# Patient Record
Sex: FEMALE | Race: WHITE | NOT HISPANIC OR LATINO
[De-identification: names, ages, dates, MRNs, and addresses within clinical notes are randomized per-mention and may not be internally consistent; named-entity substitution may affect disease eponyms.]

---

## 2017-01-17 ENCOUNTER — APPOINTMENT (OUTPATIENT)
Dept: PULMONOLOGY | Facility: CLINIC | Age: 57
End: 2017-01-17

## 2017-01-17 VITALS — HEART RATE: 83 BPM | OXYGEN SATURATION: 98 % | DIASTOLIC BLOOD PRESSURE: 86 MMHG | SYSTOLIC BLOOD PRESSURE: 142 MMHG

## 2017-01-17 VITALS — BODY MASS INDEX: 31.35 KG/M2 | WEIGHT: 150 LBS

## 2017-04-11 ENCOUNTER — APPOINTMENT (OUTPATIENT)
Dept: PULMONOLOGY | Facility: CLINIC | Age: 57
End: 2017-04-11

## 2017-04-11 VITALS
SYSTOLIC BLOOD PRESSURE: 126 MMHG | DIASTOLIC BLOOD PRESSURE: 82 MMHG | OXYGEN SATURATION: 98 % | HEART RATE: 76 BPM | HEIGHT: 58 IN | WEIGHT: 150 LBS | BODY MASS INDEX: 31.49 KG/M2

## 2017-04-11 RX ORDER — CODEINE PHOSPHATE AND GUAIFENESIN 10; 100 MG/5ML; MG/5ML
100-10 LIQUID ORAL
Qty: 150 | Refills: 0 | Status: DISCONTINUED | COMMUNITY
Start: 2016-11-10

## 2017-04-11 RX ORDER — AZITHROMYCIN 500 MG/1
500 TABLET, FILM COATED ORAL
Qty: 5 | Refills: 0 | Status: DISCONTINUED | COMMUNITY
Start: 2016-11-10

## 2017-07-18 ENCOUNTER — APPOINTMENT (OUTPATIENT)
Dept: PULMONOLOGY | Facility: CLINIC | Age: 57
End: 2017-07-18

## 2018-04-02 ENCOUNTER — APPOINTMENT (OUTPATIENT)
Dept: PULMONOLOGY | Facility: CLINIC | Age: 58
End: 2018-04-02
Payer: COMMERCIAL

## 2018-04-02 VITALS
SYSTOLIC BLOOD PRESSURE: 118 MMHG | HEIGHT: 58 IN | DIASTOLIC BLOOD PRESSURE: 78 MMHG | HEART RATE: 88 BPM | WEIGHT: 144 LBS | BODY MASS INDEX: 30.23 KG/M2 | OXYGEN SATURATION: 98 %

## 2018-04-02 PROCEDURE — 99214 OFFICE O/P EST MOD 30 MIN: CPT | Mod: 25

## 2018-04-02 PROCEDURE — 85018 HEMOGLOBIN: CPT | Mod: QW

## 2018-04-02 PROCEDURE — 94729 DIFFUSING CAPACITY: CPT

## 2018-04-02 PROCEDURE — 94060 EVALUATION OF WHEEZING: CPT

## 2018-04-02 PROCEDURE — 94727 GAS DIL/WSHOT DETER LNG VOL: CPT

## 2018-04-02 PROCEDURE — 94664 DEMO&/EVAL PT USE INHALER: CPT | Mod: 59

## 2018-05-08 ENCOUNTER — APPOINTMENT (OUTPATIENT)
Dept: PULMONOLOGY | Facility: CLINIC | Age: 58
End: 2018-05-08

## 2018-07-10 ENCOUNTER — APPOINTMENT (OUTPATIENT)
Dept: PULMONOLOGY | Facility: CLINIC | Age: 58
End: 2018-07-10

## 2019-03-20 ENCOUNTER — APPOINTMENT (OUTPATIENT)
Dept: PULMONOLOGY | Facility: CLINIC | Age: 59
End: 2019-03-20
Payer: COMMERCIAL

## 2019-03-20 VITALS
SYSTOLIC BLOOD PRESSURE: 130 MMHG | HEIGHT: 58 IN | WEIGHT: 147 LBS | DIASTOLIC BLOOD PRESSURE: 76 MMHG | BODY MASS INDEX: 30.86 KG/M2 | HEART RATE: 88 BPM | OXYGEN SATURATION: 96 %

## 2019-03-20 PROCEDURE — 94060 EVALUATION OF WHEEZING: CPT

## 2019-03-20 PROCEDURE — 99214 OFFICE O/P EST MOD 30 MIN: CPT | Mod: 25

## 2019-03-20 PROCEDURE — 94664 DEMO&/EVAL PT USE INHALER: CPT | Mod: 59

## 2019-03-20 RX ORDER — BENZONATATE 100 MG/1
100 CAPSULE ORAL
Qty: 30 | Refills: 0 | Status: DISCONTINUED | COMMUNITY
Start: 2018-10-15

## 2019-03-20 RX ORDER — LEVOFLOXACIN 500 MG/1
500 TABLET, FILM COATED ORAL
Qty: 10 | Refills: 0 | Status: DISCONTINUED | COMMUNITY
Start: 2018-10-15

## 2019-03-20 NOTE — DISCUSSION/SUMMARY
[FreeTextEntry1] : \par #1. Consider ENT evaluation for PNDS and sinus complaints\par #2. Continue cough medicine as needed\par #3. Continue Advair and Montelukast for mild asthma given reduction in spirometry and significant BD response in the past\par #4. ProAir as needed\par #5. Prednisone taper; consider empiric abx if no improvement\par #6. Nasonex or Triamcinolone for PNDS as needed\par #7. Diet and exercise for weight loss\par #8. F/u in 2 months with PFTs

## 2019-03-20 NOTE — PROCEDURE
[FreeTextEntry1] : PFTs performed previously were essentially normal and was at baseline.\par PFTs 4/2/18 - normal FVC and mildly reduced FEV1 with an obstructive pattern but no significant BD response. Lung volumes and diffusion capacity are normal.\par Spirometry 3/20/19 - Minimally reduced FVC and mildly reduced FEV1 with an obstructive pattern but no significant BD response; slightly worse than previous\par

## 2019-03-20 NOTE — PHYSICAL EXAM
[General Appearance - Well Developed] : well developed [Normal Appearance] : normal appearance [Normal Conjunctiva] : the conjunctiva exhibited no abnormalities [General Appearance - In No Acute Distress] : no acute distress [Normal Oropharynx] : normal oropharynx [Neck Appearance] : the appearance of the neck was normal [Heart Rate And Rhythm] : heart rate and rhythm were normal [Heart Sounds] : normal S1 and S2 [Murmurs] : no murmurs present [Edema] : no peripheral edema present [] : no respiratory distress [Respiration, Rhythm And Depth] : normal respiratory rhythm and effort [Exaggerated Use Of Accessory Muscles For Inspiration] : no accessory muscle use [Auscultation Breath Sounds / Voice Sounds] : lungs were clear to auscultation bilaterally [Abnormal Walk] : normal gait [Abdomen Soft] : soft [Cyanosis, Localized] : no localized cyanosis [Oriented To Time, Place, And Person] : oriented to person, place, and time [Nail Clubbing] : no clubbing of the fingernails [No Focal Deficits] : no focal deficits [FreeTextEntry1] : No abnormalities

## 2019-03-20 NOTE — HISTORY OF PRESENT ILLNESS
[Mild Persistent] : mild persistent [Doing Well] : doing well [Follow-Up - Routine Clinic] : a routine clinic follow-up of [Excess Weight] : excess weight [Dyspnea] : dyspnea [Low Calorie Diet] : low calorie diet [Fair Compliance] : fair compliance with treatment [Fair Tolerance] : fair tolerance of treatment [Poor Symptom Control] : poor symptom control [Dyslipidemia] : dyslipidemia [High] : high [Low Calorie] : low calorie [Well Balanced Diet] : well balanced meals [None] : The patient does not exercise [FreeTextEntry1] : The patient has been non-compliant with f/u and has not been seen in nearly one year. She now presents for URI symptoms including increased cough, nasal congestion, and SOB. \par  [URI] : upper respiratory tract infection [Adherent] : the patient is adherent with ~his/her~ medication regimen [Goals--Doing Well] : the patient is doing well with ~his/her~ asthma goals

## 2019-03-20 NOTE — REVIEW OF SYSTEMS
[Cough] : cough [Chest Tightness] : chest tightness [Wheezing] : wheezing [Thyroid Problem] : thyroid problem [Fever] : no fever [Chills] : no chills [Dry Eyes] : no dryness of the eyes [Eye Irritation] : no ~T irritation of the eyes [Nasal Congestion] : no nasal congestion [Epistaxis] : no nosebleeds [Postnasal Drip] : no postnasal drip [Sinus Problems] : no sinus problems [Sputum] : not coughing up ~M sputum [Dyspnea] : no dyspnea [Hemoptysis] : no hemoptysis [Hypertension] : no ~T hypertension [Pleuritic Pain] : no pleuritic pain [Dysrhythmia] : no dysrhythmia [Chest Discomfort] : no chest discomfort [Murmurs] : no murmurs were heard [Palpitations] : no palpitations [Edema] : ~T edema was not present [Hay Fever] : no hay fever [Nausea] : no nausea [Itchy Eyes] : no itching of ~T the eyes [Reflux] : no reflux [Constipation] : no constipation [Vomiting] : no vomiting [Abdominal Pain] : no abdominal pain [Diarrhea] : no diarrhea [Trauma] : no ~T physical trauma [Dysuria] : no dysuria [Fracture] : no fracture [Anemia] : no anemia [Headache] : no headache [Dizziness] : no dizziness [Numbness] : no numbness [Syncope] : no fainting [Paralysis] : no paralysis was seen [Depression] : no depression [Seizures] : no seizures [Diabetes] : no diabetes mellitus [Anxiety] : no anxiety [Witnessed Apneas] : demonstrated no ~M apnea [Snoring] : no snoring [Nonrestorative Sleep] : restorative sleep

## 2019-03-20 NOTE — REASON FOR VISIT
[Follow-Up] : a follow-up visit [Asthma] : asthma [Cough] : cough [Shortness of Breath] : shortness of Breath [FreeTextEntry2] : obesity

## 2019-03-20 NOTE — CONSULT LETTER
[Dear  ___] : Dear  [unfilled], [Consult Letter:] : I had the pleasure of evaluating your patient, [unfilled]. [Please see my note below.] : Please see my note below. [Consult Closing:] : Thank you very much for allowing me to participate in the care of this patient.  If you have any questions, please do not hesitate to contact me. [Sincerely,] : Sincerely, [Missael Matias MD] : Missael Matias MD [FreeTextEntry3] : Missael Matias MD, FCCP, JEAN-CLAUDE. ABSM\par

## 2019-07-25 ENCOUNTER — APPOINTMENT (OUTPATIENT)
Dept: PULMONOLOGY | Facility: CLINIC | Age: 59
End: 2019-07-25
Payer: COMMERCIAL

## 2019-07-25 VITALS
HEART RATE: 89 BPM | RESPIRATION RATE: 16 BRPM | DIASTOLIC BLOOD PRESSURE: 78 MMHG | OXYGEN SATURATION: 97 % | SYSTOLIC BLOOD PRESSURE: 124 MMHG

## 2019-07-25 VITALS — HEIGHT: 58 IN | BODY MASS INDEX: 30.44 KG/M2 | WEIGHT: 145 LBS

## 2019-07-25 DIAGNOSIS — Z87.09 PERSONAL HISTORY OF OTHER DISEASES OF THE RESPIRATORY SYSTEM: ICD-10-CM

## 2019-07-25 PROCEDURE — 99214 OFFICE O/P EST MOD 30 MIN: CPT | Mod: 25

## 2019-07-25 PROCEDURE — 94727 GAS DIL/WSHOT DETER LNG VOL: CPT

## 2019-07-25 PROCEDURE — 94010 BREATHING CAPACITY TEST: CPT

## 2019-07-25 PROCEDURE — 94729 DIFFUSING CAPACITY: CPT

## 2019-07-25 PROCEDURE — 85018 HEMOGLOBIN: CPT | Mod: QW

## 2019-07-25 RX ORDER — PREDNISONE 10 MG/1
10 TABLET ORAL
Qty: 50 | Refills: 0 | Status: DISCONTINUED | COMMUNITY
Start: 2019-03-20 | End: 2019-07-25

## 2019-07-25 RX ORDER — PREDNISONE 10 MG/1
10 TABLET ORAL
Qty: 50 | Refills: 0 | Status: DISCONTINUED | COMMUNITY
Start: 2018-04-02 | End: 2019-07-25

## 2019-07-25 NOTE — REVIEW OF SYSTEMS
[Cough] : cough [Chest Tightness] : chest tightness [Wheezing] : wheezing [Thyroid Problem] : thyroid problem [Fever] : no fever [Chills] : no chills [Dry Eyes] : no dryness of the eyes [Eye Irritation] : no ~T irritation of the eyes [Nasal Congestion] : no nasal congestion [Epistaxis] : no nosebleeds [Postnasal Drip] : no postnasal drip [Sinus Problems] : no sinus problems [Sputum] : not coughing up ~M sputum [Hemoptysis] : no hemoptysis [Dyspnea] : no dyspnea [Pleuritic Pain] : no pleuritic pain [Hypertension] : no ~T hypertension [Chest Discomfort] : no chest discomfort [Dysrhythmia] : no dysrhythmia [Murmurs] : no murmurs were heard [Palpitations] : no palpitations [Edema] : ~T edema was not present [Hay Fever] : no hay fever [Itchy Eyes] : no itching of ~T the eyes [Reflux] : no reflux [Nausea] : no nausea [Vomiting] : no vomiting [Constipation] : no constipation [Diarrhea] : no diarrhea [Abdominal Pain] : no abdominal pain [Dysuria] : no dysuria [Trauma] : no ~T physical trauma [Fracture] : no fracture [Anemia] : no anemia [Headache] : no headache [Dizziness] : no dizziness [Syncope] : no fainting [Numbness] : no numbness [Paralysis] : no paralysis was seen [Seizures] : no seizures [Depression] : no depression [Anxiety] : no anxiety [Diabetes] : no diabetes mellitus [Snoring] : no snoring [Witnessed Apneas] : demonstrated no ~M apnea [Nonrestorative Sleep] : restorative sleep

## 2019-07-25 NOTE — PHYSICAL EXAM
[General Appearance - Well Developed] : well developed [Normal Appearance] : normal appearance [General Appearance - In No Acute Distress] : no acute distress [Normal Conjunctiva] : the conjunctiva exhibited no abnormalities [Normal Oropharynx] : normal oropharynx [Neck Appearance] : the appearance of the neck was normal [Heart Rate And Rhythm] : heart rate and rhythm were normal [Heart Sounds] : normal S1 and S2 [Murmurs] : no murmurs present [Edema] : no peripheral edema present [] : no respiratory distress [Respiration, Rhythm And Depth] : normal respiratory rhythm and effort [Exaggerated Use Of Accessory Muscles For Inspiration] : no accessory muscle use [Auscultation Breath Sounds / Voice Sounds] : lungs were clear to auscultation bilaterally [Abdomen Soft] : soft [Abnormal Walk] : normal gait [Nail Clubbing] : no clubbing of the fingernails [Cyanosis, Localized] : no localized cyanosis [No Focal Deficits] : no focal deficits [Oriented To Time, Place, And Person] : oriented to person, place, and time [FreeTextEntry1] : No abnormalities

## 2019-07-25 NOTE — HISTORY OF PRESENT ILLNESS
[Mild Persistent] : mild persistent [Doing Well] : doing well [URI] : upper respiratory tract infection [Adherent] : the patient is adherent with ~his/her~ medication regimen [Goals--Doing Well] : the patient is doing well with ~his/her~ asthma goals [Follow-Up - Routine Clinic] : a routine clinic follow-up of [Excess Weight] : excess weight [Dyspnea] : dyspnea [Low Calorie Diet] : low calorie diet [Fair Compliance] : fair compliance with treatment [Fair Tolerance] : fair tolerance of treatment [Poor Symptom Control] : poor symptom control [Dyslipidemia] : dyslipidemia [High] : high [Low Calorie] : low calorie [Well Balanced Diet] : well balanced meals [None] : The patient does not exercise [FreeTextEntry1] : The patient has been non-compliant with f/u and has not been seen in nearly one year. She now presents for URI symptoms including increased cough, nasal congestion, and SOB. \par

## 2019-07-25 NOTE — PROCEDURE
[FreeTextEntry1] : PFTs performed previously were essentially normal and was at baseline.\par PFTs 4/2/18 - normal FVC and mildly reduced FEV1 with an obstructive pattern but no significant BD response. Lung volumes and diffusion capacity are normal.\par Spirometry 3/20/19 - Minimally reduced FVC and mildly reduced FEV1 with an obstructive pattern but no significant BD response; slightly worse than previous\par PFTs 7/25/19 - Normal FVC and mildly reduced FEV1 without an obstructive pattern today but has had obstruction in the past; lung volumes and diffusion capacity are normal. Overall, stable lung function\par

## 2019-07-25 NOTE — DISCUSSION/SUMMARY
[FreeTextEntry1] : \par #1. Consider ENT evaluation for PNDS and sinus complaints but will hold off as she is feeling better\par #2. Continue cough medicine as needed\par #3. Continue Advair and Montelukast for mild asthma given reduction in spirometry and significant BD response in the past\par #4. ProAir as needed\par #5. Prednisone as needed\par #6. Nasonex or Triamcinolone for PNDS as needed\par #7. Diet and exercise for weight loss\par #8. F/u in 3-4 months

## 2019-11-18 ENCOUNTER — APPOINTMENT (OUTPATIENT)
Dept: PULMONOLOGY | Facility: CLINIC | Age: 59
End: 2019-11-18
Payer: COMMERCIAL

## 2019-11-18 VITALS
HEIGHT: 57 IN | WEIGHT: 145 LBS | HEART RATE: 80 BPM | BODY MASS INDEX: 31.28 KG/M2 | SYSTOLIC BLOOD PRESSURE: 125 MMHG | DIASTOLIC BLOOD PRESSURE: 80 MMHG | OXYGEN SATURATION: 97 %

## 2019-11-18 PROCEDURE — 99214 OFFICE O/P EST MOD 30 MIN: CPT

## 2019-11-18 NOTE — PROCEDURE
[FreeTextEntry1] : PFTs performed previously were essentially normal and was at baseline.\par PFTs 4/2/18 - normal FVC and mildly reduced FEV1 with an obstructive pattern but no significant BD response. Lung volumes and diffusion capacity are normal.\par Spirometry 3/20/19 - Minimally reduced FVC and mildly reduced FEV1 with an obstructive pattern but no significant BD response; slightly worse than previous\par PFTs 7/25/19 - Normal FVC and mildly reduced FEV1 without an obstructive pattern but has had obstruction in the past; lung volumes and diffusion capacity were normal. Overall, stable lung function\par

## 2019-11-18 NOTE — REVIEW OF SYSTEMS
[Cough] : cough [Chest Tightness] : chest tightness [Wheezing] : wheezing [Thyroid Problem] : thyroid problem [Fever] : no fever [Dry Eyes] : no dryness of the eyes [Chills] : no chills [Eye Irritation] : no ~T irritation of the eyes [Nasal Congestion] : no nasal congestion [Epistaxis] : no nosebleeds [Postnasal Drip] : no postnasal drip [Sinus Problems] : no sinus problems [Sputum] : not coughing up ~M sputum [Hemoptysis] : no hemoptysis [Pleuritic Pain] : no pleuritic pain [Dyspnea] : no dyspnea [Hypertension] : no ~T hypertension [Chest Discomfort] : no chest discomfort [Dysrhythmia] : no dysrhythmia [Murmurs] : no murmurs were heard [Palpitations] : no palpitations [Edema] : ~T edema was not present [Hay Fever] : no hay fever [Itchy Eyes] : no itching of ~T the eyes [Reflux] : no reflux [Nausea] : no nausea [Vomiting] : no vomiting [Constipation] : no constipation [Diarrhea] : no diarrhea [Abdominal Pain] : no abdominal pain [Dysuria] : no dysuria [Trauma] : no ~T physical trauma [Fracture] : no fracture [Anemia] : no anemia [Headache] : no headache [Syncope] : no fainting [Dizziness] : no dizziness [Numbness] : no numbness [Paralysis] : no paralysis was seen [Seizures] : no seizures [Depression] : no depression [Anxiety] : no anxiety [Diabetes] : no diabetes mellitus [Witnessed Apneas] : demonstrated no ~M apnea [Snoring] : no snoring [Nonrestorative Sleep] : restorative sleep

## 2019-11-18 NOTE — DISCUSSION/SUMMARY
[FreeTextEntry1] : \par #1. Consider ENT evaluation for PNDS and sinus complaints but will hold off as she is feeling better\par #2. Continue cough medicine as needed\par #3. Continue Advair and Montelukast for mild asthma given reduction in spirometry and significant BD response in the past\par #4. ProAir as needed\par #5. Prednisone as needed\par #6. Nasonex/Flonase for PNDS as needed\par #7. Diet and exercise for weight loss\par #8. F/u in 3-4 months\par #9. Pt s/p flu vaccine for 2019

## 2019-11-18 NOTE — CONSULT LETTER
[Dear  ___] : Dear  [unfilled], [Consult Letter:] : I had the pleasure of evaluating your patient, [unfilled]. [Please see my note below.] : Please see my note below. [Consult Closing:] : Thank you very much for allowing me to participate in the care of this patient.  If you have any questions, please do not hesitate to contact me. [Sincerely,] : Sincerely, [Missael Matias MD] : Missael Matias MD [FreeTextEntry3] : Missael Matias MD, FCCP, D. ABSM\par Pulmonary and Sleep Medicine\par [facility] [place]

## 2019-11-18 NOTE — PHYSICAL EXAM
[General Appearance - Well Developed] : well developed [Normal Appearance] : normal appearance [General Appearance - In No Acute Distress] : no acute distress [Normal Oropharynx] : normal oropharynx [Normal Conjunctiva] : the conjunctiva exhibited no abnormalities [Neck Appearance] : the appearance of the neck was normal [Heart Sounds] : normal S1 and S2 [Heart Rate And Rhythm] : heart rate and rhythm were normal [Edema] : no peripheral edema present [Murmurs] : no murmurs present [] : no respiratory distress [Exaggerated Use Of Accessory Muscles For Inspiration] : no accessory muscle use [Respiration, Rhythm And Depth] : normal respiratory rhythm and effort [Auscultation Breath Sounds / Voice Sounds] : lungs were clear to auscultation bilaterally [Abdomen Soft] : soft [Abnormal Walk] : normal gait [Nail Clubbing] : no clubbing of the fingernails [Cyanosis, Localized] : no localized cyanosis [No Focal Deficits] : no focal deficits [Oriented To Time, Place, And Person] : oriented to person, place, and time [FreeTextEntry1] : No abnormalities

## 2019-11-18 NOTE — HISTORY OF PRESENT ILLNESS
[Mild Persistent] : mild persistent [Doing Well] : doing well [Adherent] : the patient is adherent with ~his/her~ medication regimen [Goals--Doing Well] : the patient is doing well with ~his/her~ asthma goals [Excess Weight] : excess weight [Follow-Up - Routine Clinic] : a routine clinic follow-up of [Dyspnea] : dyspnea [Low Calorie Diet] : low calorie diet [Fair Compliance] : fair compliance with treatment [Fair Tolerance] : fair tolerance of treatment [Poor Symptom Control] : poor symptom control [Dyslipidemia] : dyslipidemia [High] : high [Low Calorie] : low calorie [Well Balanced Diet] : well balanced meals [FreeTextEntry1] : Patient previously had complaints of a URI which has resolved.\par Patient c/o SOBOE but is otherwise without associated respiratory complaints.\par  [None] : None

## 2020-03-24 ENCOUNTER — APPOINTMENT (OUTPATIENT)
Dept: PULMONOLOGY | Facility: CLINIC | Age: 60
End: 2020-03-24

## 2020-12-02 ENCOUNTER — APPOINTMENT (OUTPATIENT)
Dept: PULMONOLOGY | Facility: CLINIC | Age: 60
End: 2020-12-02
Payer: COMMERCIAL

## 2020-12-02 VITALS — WEIGHT: 147 LBS | OXYGEN SATURATION: 98 % | BODY MASS INDEX: 31.81 KG/M2 | HEART RATE: 87 BPM

## 2020-12-02 PROCEDURE — 99214 OFFICE O/P EST MOD 30 MIN: CPT

## 2020-12-02 PROCEDURE — 99072 ADDL SUPL MATRL&STAF TM PHE: CPT

## 2020-12-02 RX ORDER — ERYTHROMYCIN 5 MG/G
5 OINTMENT OPHTHALMIC
Qty: 4 | Refills: 0 | Status: DISCONTINUED | COMMUNITY
Start: 2020-10-23

## 2020-12-02 RX ORDER — OFLOXACIN 3 MG/ML
0.3 SOLUTION/ DROPS OPHTHALMIC
Qty: 5 | Refills: 0 | Status: DISCONTINUED | COMMUNITY
Start: 2020-10-23

## 2020-12-02 RX ORDER — HYDROCORTISONE VALERATE 2 MG/G
0.2 CREAM TOPICAL
Qty: 45 | Refills: 0 | Status: DISCONTINUED | COMMUNITY
Start: 2016-12-15 | End: 2020-12-02

## 2020-12-02 NOTE — CONSULT LETTER
[Dear  ___] : Dear  [unfilled], [Consult Letter:] : I had the pleasure of evaluating your patient, [unfilled]. [Please see my note below.] : Please see my note below. [Consult Closing:] : Thank you very much for allowing me to participate in the care of this patient.  If you have any questions, please do not hesitate to contact me. [Sincerely,] : Sincerely, [Missael Matias MD] : Missael Matias MD [FreeTextEntry3] : Missael Matias MD, FCCP, D. ABSM\par Pulmonary and Sleep Medicine\par [facility] [place]

## 2020-12-02 NOTE — DISCUSSION/SUMMARY
[FreeTextEntry1] : \par #1. Consider ENT evaluation for PNDS and sinus complaints if symptoms persist\par #2. Continue cough medicine as needed\par #3. Continue Advair and Montelukast for mild asthma given reduction in spirometry and significant BD response in the past\par #4. ProAir as needed\par #5. Prednisone as needed; will Rx taper for mild current asthma symptoms\par #6. Nasonex/Flonase for PNDS as needed\par #7. Diet and exercise for weight loss\par #8. F/u in 4-6 weeks with PFTs\par #9. Pt s/p flu vaccine for 2019\par #10. Reviewed risks of exposure and symptoms of Covid-19 virus, including how the virus is spread and precautions to avoid beltran virus.\par \par Patient's questions were answered and patient appears to understand these recommendations

## 2020-12-02 NOTE — HISTORY OF PRESENT ILLNESS
[Mild Persistent] : mild persistent [Doing Well] : doing well [Adherent] : the patient is adherent with ~his/her~ medication regimen [Goals--Doing Well] : the patient is doing well with ~his/her~ asthma goals [Follow-Up - Routine Clinic] : a routine clinic follow-up of [Excess Weight] : excess weight [Dyspnea] : dyspnea [Low Calorie Diet] : low calorie diet [Fair Compliance] : fair compliance with treatment [Fair Tolerance] : fair tolerance of treatment [Poor Symptom Control] : poor symptom control [Dyslipidemia] : dyslipidemia [High] : high [Low Calorie] : low calorie [Well Balanced Diet] : well balanced meals [None] : The patient does not exercise [FreeTextEntry1] : Patient previously had complaints of a URI which has resolved.\par Patient c/o SOBOE but is otherwise without associated respiratory complaints. She was last seen > 1 year ago. She now p/w increased SOB and mild cough though without fevers or other respiratory complaints. She reports increased wheeze at times which resolves with Albuterol use.\par

## 2020-12-28 ENCOUNTER — RX CHANGE (OUTPATIENT)
Age: 60
End: 2020-12-28

## 2021-01-03 ENCOUNTER — APPOINTMENT (OUTPATIENT)
Dept: DISASTER EMERGENCY | Facility: CLINIC | Age: 61
End: 2021-01-03

## 2021-01-03 DIAGNOSIS — Z01.818 ENCOUNTER FOR OTHER PREPROCEDURAL EXAMINATION: ICD-10-CM

## 2021-01-04 LAB — SARS-COV-2 N GENE NPH QL NAA+PROBE: NOT DETECTED

## 2021-01-06 ENCOUNTER — APPOINTMENT (OUTPATIENT)
Dept: PULMONOLOGY | Facility: CLINIC | Age: 61
End: 2021-01-06
Payer: COMMERCIAL

## 2021-01-06 ENCOUNTER — TRANSCRIPTION ENCOUNTER (OUTPATIENT)
Age: 61
End: 2021-01-06

## 2021-01-06 VITALS — WEIGHT: 149 LBS | HEIGHT: 57 IN | BODY MASS INDEX: 32.15 KG/M2

## 2021-01-06 VITALS — OXYGEN SATURATION: 97 % | DIASTOLIC BLOOD PRESSURE: 76 MMHG | HEART RATE: 74 BPM | SYSTOLIC BLOOD PRESSURE: 130 MMHG

## 2021-01-06 PROCEDURE — 94729 DIFFUSING CAPACITY: CPT

## 2021-01-06 PROCEDURE — 94727 GAS DIL/WSHOT DETER LNG VOL: CPT

## 2021-01-06 PROCEDURE — 99214 OFFICE O/P EST MOD 30 MIN: CPT | Mod: 25

## 2021-01-06 PROCEDURE — 99072 ADDL SUPL MATRL&STAF TM PHE: CPT

## 2021-01-06 PROCEDURE — 85018 HEMOGLOBIN: CPT | Mod: QW

## 2021-01-06 PROCEDURE — 94010 BREATHING CAPACITY TEST: CPT

## 2021-01-06 NOTE — HISTORY OF PRESENT ILLNESS
[Mild Persistent] : mild persistent [Doing Well] : doing well [Adherent] : the patient is adherent with ~his/her~ medication regimen [Goals--Doing Well] : the patient is doing well with ~his/her~ asthma goals [Follow-Up - Routine Clinic] : a routine clinic follow-up of [Excess Weight] : excess weight [Dyspnea] : dyspnea [Low Calorie Diet] : low calorie diet [Fair Compliance] : fair compliance with treatment [Fair Tolerance] : fair tolerance of treatment [Poor Symptom Control] : poor symptom control [Dyslipidemia] : dyslipidemia [High] : high [Low Calorie] : low calorie [Well Balanced Diet] : well balanced meals [None] : The patient does not exercise [FreeTextEntry1] : Patient c/o SOBOE but is otherwise without associated respiratory complaints.She now p/w increased SOB and mild cough though without fevers or other respiratory complaints. She reports increased wheeze at times which resolves with Albuterol use but recently she has been c/o SOBOE with no improvement with Albuterol inhaler/nebulizer or with prednisone. She reports she has been quite sedentary.\par

## 2021-01-06 NOTE — DISCUSSION/SUMMARY
[FreeTextEntry1] : \par #1. Consider ENT evaluation for PNDS and sinus complaints if symptoms persist\par #2. Continue cough medicine as needed\par #3. Continue Advair and Montelukast for mild asthma given reduction in spirometry and significant BD response in the past\par #4. ProAir as needed\par #5. Prednisone as needed though did not have relief with it\par #6. Nasonex/Flonase for PNDS as needed\par #7. Diet and exercise for weight loss\par #8. CXR to further evaluate SOBOE\par #9. Pt s/p flu vaccine for 2020\par #10. Cardiology evaluation for SOBOE though may be related to weight and deconditioning\par #11. F/u in 2-3 weeks with CXR\par #12. Reviewed risks of exposure and symptoms of Covid-19 virus, including how the virus is spread and precautions to avoid beltran virus.\par \par Patient's questions were answered and patient appears to understand these recommendations

## 2021-01-06 NOTE — PROCEDURE
[FreeTextEntry1] : PFTs performed previously were essentially normal and was at baseline.\par PFTs 4/2/18 - normal FVC and mildly reduced FEV1 with an obstructive pattern but no significant BD response. Lung volumes and diffusion capacity are normal.\par Spirometry 3/20/19 - Minimally reduced FVC and mildly reduced FEV1 with an obstructive pattern but no significant BD response; slightly worse than previous\par PFTs 7/25/19 - Normal FVC and mildly reduced FEV1 without an obstructive pattern but has had obstruction in the past; lung volumes and diffusion capacity were normal. Overall, stable lung function\par PFTs 1/6/20 - Normal FVC and mildly reduced FEV1 without an obstructive pattern and with normal lung volumes and diffusion capacity; essentially at baseline\par

## 2021-01-06 NOTE — CONSULT LETTER
[Dear  ___] : Dear  [unfilled], [Consult Letter:] : I had the pleasure of evaluating your patient, [unfilled]. [Please see my note below.] : Please see my note below. [Consult Closing:] : Thank you very much for allowing me to participate in the care of this patient.  If you have any questions, please do not hesitate to contact me. [Sincerely,] : Sincerely, [FreeTextEntry3] : Missael Matias MD, FCCP, D. ABSM\par Pulmonary and Sleep Medicine\par [facility] at [place]

## 2021-01-07 ENCOUNTER — OUTPATIENT (OUTPATIENT)
Dept: OUTPATIENT SERVICES | Facility: HOSPITAL | Age: 61
LOS: 1 days | End: 2021-01-07
Payer: COMMERCIAL

## 2021-01-07 ENCOUNTER — APPOINTMENT (OUTPATIENT)
Dept: RADIOLOGY | Facility: CLINIC | Age: 61
End: 2021-01-07
Payer: COMMERCIAL

## 2021-01-07 ENCOUNTER — NON-APPOINTMENT (OUTPATIENT)
Age: 61
End: 2021-01-07

## 2021-01-07 DIAGNOSIS — R06.02 SHORTNESS OF BREATH: ICD-10-CM

## 2021-01-07 PROCEDURE — 71046 X-RAY EXAM CHEST 2 VIEWS: CPT

## 2021-01-07 PROCEDURE — 71046 X-RAY EXAM CHEST 2 VIEWS: CPT | Mod: 26

## 2021-01-12 ENCOUNTER — NON-APPOINTMENT (OUTPATIENT)
Age: 61
End: 2021-01-12

## 2021-01-12 ENCOUNTER — APPOINTMENT (OUTPATIENT)
Dept: CARDIOLOGY | Facility: CLINIC | Age: 61
End: 2021-01-12
Payer: COMMERCIAL

## 2021-01-12 VITALS
BODY MASS INDEX: 32.79 KG/M2 | DIASTOLIC BLOOD PRESSURE: 80 MMHG | WEIGHT: 152 LBS | TEMPERATURE: 98 F | SYSTOLIC BLOOD PRESSURE: 147 MMHG | HEIGHT: 57 IN | RESPIRATION RATE: 16 BRPM | HEART RATE: 88 BPM

## 2021-01-12 VITALS — HEIGHT: 59 IN | BODY MASS INDEX: 30.7 KG/M2

## 2021-01-12 DIAGNOSIS — R00.2 PALPITATIONS: ICD-10-CM

## 2021-01-12 DIAGNOSIS — R01.1 CARDIAC MURMUR, UNSPECIFIED: ICD-10-CM

## 2021-01-12 PROCEDURE — 99244 OFF/OP CNSLTJ NEW/EST MOD 40: CPT

## 2021-01-12 PROCEDURE — 93000 ELECTROCARDIOGRAM COMPLETE: CPT

## 2021-01-12 PROCEDURE — 99072 ADDL SUPL MATRL&STAF TM PHE: CPT

## 2021-01-12 RX ORDER — PREDNISONE 10 MG/1
10 TABLET ORAL
Qty: 50 | Refills: 0 | Status: DISCONTINUED | COMMUNITY
Start: 2020-12-02 | End: 2021-01-12

## 2021-01-12 RX ORDER — TRIAMCINOLONE ACETONIDE 55 UG/1
55 SOLUTION/ DROPS OPHTHALMIC
Refills: 0 | Status: DISCONTINUED | COMMUNITY
Start: 2017-04-11 | End: 2021-01-12

## 2021-01-12 NOTE — PHYSICAL EXAM
[General Appearance - Well Developed] : well developed [General Appearance - Well Nourished] : well nourished [Normal Conjunctiva] : the conjunctiva exhibited no abnormalities [Normal Oropharynx] : normal oropharynx [Normal Jugular Venous V Waves Present] : normal jugular venous V waves present [Heart Rate And Rhythm] : heart rate and rhythm were normal [Heart Sounds] : normal S1 and S2 [Edema] : no peripheral edema present [Respiration, Rhythm And Depth] : normal respiratory rhythm and effort [Auscultation Breath Sounds / Voice Sounds] : lungs were clear to auscultation bilaterally [Abdomen Soft] : soft [Abdomen Tenderness] : non-tender [Abnormal Walk] : normal gait [Nail Clubbing] : no clubbing of the fingernails [Cyanosis, Localized] : no localized cyanosis [Skin Color & Pigmentation] : normal skin color and pigmentation [FreeTextEntry1] : II/VI early systolic murmur at the base, ? radiation to the neck  [Oriented To Time, Place, And Person] : oriented to person, place, and time

## 2021-01-12 NOTE — HISTORY OF PRESENT ILLNESS
[FreeTextEntry1] : Patient is a 61yo F former smoker, strong family h/o CAD, obesity, asthma here for cardiac evaluation of dyspnea. Having worsening shortness of breath and recent PFTS not changed significantly. CXR done as well. Feels fluttering in chest at times.  had COVID in December, got clearance from [facility] to end quarantine. BReathing got worse past few weeks and using inhaler more. Will feel winded going up stairs or moving quickly. Temperature affects her as well, worse inside if too hot. Notes some chest pressure as well, not necessarily exertional. COmes and goes spontaneously. Will get a bit dizzy if moves too fast. No orthopnea, does wake up at times at night a bit SOB. No edema/syncope/claudication.  Has not been taking statin regularly or thyroid medication. \par \par Lives with  and teenage kids. 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 \par \par ROS: GI negative, all others negative

## 2021-01-12 NOTE — DISCUSSION/SUMMARY
[FreeTextEntry1] : Patient is a 59yo F former smoker, family h/o CAD, obesity, asthma here for cardiac evaluation of dyspnea. Per pulmonary, change in breathing not related to her asthma. Possibly weight and deconditioning, also significant stress. NEeds cardiac eval to ensure not an anginal equivalent. Strong family history of CAD as well. \par \par 1. Echo and exercise nuclear stress testing to evaluate TESFAYE/CP/Palps \par 2. Recommend aggressive diet and lifestyle modifications, counselled on weight loss\par 3. Regular follow up with PMD and pulmonary\par 4. Advised to continue statin\par 5. Follow up after testing

## 2021-01-20 ENCOUNTER — APPOINTMENT (OUTPATIENT)
Dept: CARDIOLOGY | Facility: CLINIC | Age: 61
End: 2021-01-20
Payer: COMMERCIAL

## 2021-01-20 PROCEDURE — 78452 HT MUSCLE IMAGE SPECT MULT: CPT

## 2021-01-20 PROCEDURE — A9500: CPT

## 2021-01-20 PROCEDURE — 93015 CV STRESS TEST SUPVJ I&R: CPT

## 2021-01-20 PROCEDURE — 99072 ADDL SUPL MATRL&STAF TM PHE: CPT

## 2021-01-20 RX ORDER — AMINOPHYLLINE 25 MG/ML
25 INJECTION, SOLUTION INTRAVENOUS
Qty: 0 | Refills: 0 | Status: COMPLETED | OUTPATIENT
Start: 2021-01-20

## 2021-01-20 RX ORDER — REGADENOSON 0.08 MG/ML
0.4 INJECTION, SOLUTION INTRAVENOUS
Qty: 1 | Refills: 0 | Status: COMPLETED | OUTPATIENT
Start: 2021-01-20

## 2021-01-20 RX ADMIN — REGADENOSON 0 MG/5ML: 0.08 INJECTION, SOLUTION INTRAVENOUS at 00:00

## 2021-02-03 ENCOUNTER — APPOINTMENT (OUTPATIENT)
Dept: CARDIOLOGY | Facility: CLINIC | Age: 61
End: 2021-02-03
Payer: COMMERCIAL

## 2021-02-03 PROCEDURE — 93306 TTE W/DOPPLER COMPLETE: CPT

## 2021-02-03 PROCEDURE — 99072 ADDL SUPL MATRL&STAF TM PHE: CPT

## 2021-02-03 PROCEDURE — 93880 EXTRACRANIAL BILAT STUDY: CPT

## 2021-02-11 ENCOUNTER — APPOINTMENT (OUTPATIENT)
Dept: CARDIOLOGY | Facility: CLINIC | Age: 61
End: 2021-02-11
Payer: COMMERCIAL

## 2021-02-11 VITALS
RESPIRATION RATE: 16 BRPM | DIASTOLIC BLOOD PRESSURE: 81 MMHG | WEIGHT: 147 LBS | SYSTOLIC BLOOD PRESSURE: 155 MMHG | TEMPERATURE: 97.7 F | HEIGHT: 59 IN | HEART RATE: 86 BPM | BODY MASS INDEX: 29.64 KG/M2

## 2021-02-11 PROCEDURE — 99214 OFFICE O/P EST MOD 30 MIN: CPT

## 2021-02-11 PROCEDURE — 99072 ADDL SUPL MATRL&STAF TM PHE: CPT

## 2021-02-11 NOTE — PHYSICAL EXAM
[General Appearance - Well Developed] : well developed [General Appearance - Well Nourished] : well nourished [Normal Conjunctiva] : the conjunctiva exhibited no abnormalities [Normal Oropharynx] : normal oropharynx [Normal Jugular Venous V Waves Present] : normal jugular venous V waves present [Respiration, Rhythm And Depth] : normal respiratory rhythm and effort [Auscultation Breath Sounds / Voice Sounds] : lungs were clear to auscultation bilaterally [Heart Rate And Rhythm] : heart rate and rhythm were normal [Heart Sounds] : normal S1 and S2 [Edema] : no peripheral edema present [Abdomen Soft] : soft [Abdomen Tenderness] : non-tender [Abnormal Walk] : normal gait [Nail Clubbing] : no clubbing of the fingernails [Cyanosis, Localized] : no localized cyanosis [Skin Color & Pigmentation] : normal skin color and pigmentation [Oriented To Time, Place, And Person] : oriented to person, place, and time [FreeTextEntry1] : II/VI early systolic murmur at the base, ? radiation to the neck

## 2021-02-11 NOTE — ASSESSMENT
[FreeTextEntry1] : \par \par  HDL 78   (2/2020) \par \par \par CAROTID 2/2021:\par 1. 1-49% ICA stenosis bilaterally\par 2. Tortuous vessel, cannot exclude higher degree stenosis distal LYLA \par 3. Antegrade fllow in the vertebral arteries bilaterally \par \par ECHO 2/2021:\par 1. Normal LV size, systolic and diastolic function. EF 60-65%\par 2. Normal RV/LA/RA \par 3. Trivial AI\par 4. Mild MR\par 5. Mild TR, RVSP 25mmHg\par \par PHARM NUCLEAR STRESS TEST 1/2021:\par 1. Negative for ischemia/infarct\par 2. EF 72%\par 3. BP hypertensive baseline, normal response

## 2021-02-11 NOTE — DISCUSSION/SUMMARY
[FreeTextEntry1] : Patient is a 59yo F former smoker, family h/o CAD, obesity, asthma here for cardiac follow up dyspnea\par -Nuclear stress testing 2/2021 without ischemia\par -Echo 2/2021 with preserved BiV function, mild MR needs surveillance only\par -Mild to moderate carotid stenosis, cannot exclude higher degree on recent duplex. Recommend med management and surveillance. \par -Dyspnea appears non-cardiac, will be seeing pulmonary again. Component deconditioning and weight \par \par 1. Continue medical management of carotid stenosis, repeat study in 6 months. Start ASA 81mg daily, increase Rosuvastatin to 10mg qhs\par 2. Recommend aggressive diet and lifestyle modifications, counselled on weight loss to help symptoms\par 3. Regular follow up with PMD and pulmonary\par 4. Needs hypertension treated. Start Valsartan 80mg daily, check BMP/Mg in a couple weeks.  Will also check CMP, CBC, TSH and BNP given continues dyspnea. Check lipids as well\par 5. Consider CTA chest \par 6. Follow up 3 months

## 2021-02-11 NOTE — HISTORY OF PRESENT ILLNESS
[FreeTextEntry1] : Patient is a 59yo F former smoker, strong family h/o CAD, obesity, asthma here for cardiac follow up of dyspnea. Due to symptoms underwent cardiac testing/work up.\par \par Was having worsening shortness of breath and recent PFTS not changed significantly. CXR done as well. Feels fluttering in chest at times.  had COVID in December, got clearance from [facility] to end quarantine. BReathing got worse past few weeks and using inhaler more. Will feel winded going up stairs or moving quickly. Temperature affects her as well, worse inside if too hot. Notes some chest pressure as well, not necessarily exertional. COmes and goes spontaneously. Will get a bit dizzy if moves too fast. No orthopnea, does wake up at times at night a bit SOB. No edema/syncope/claudication.  Has not been taking statin regularly or thyroid medication. Still getting SOB. \par \par Lives with  and teenage kids. 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 \par \par ROS: GI and  negative

## 2021-02-12 ENCOUNTER — APPOINTMENT (OUTPATIENT)
Dept: PULMONOLOGY | Facility: CLINIC | Age: 61
End: 2021-02-12
Payer: COMMERCIAL

## 2021-02-12 VITALS
SYSTOLIC BLOOD PRESSURE: 130 MMHG | WEIGHT: 147 LBS | BODY MASS INDEX: 29.69 KG/M2 | DIASTOLIC BLOOD PRESSURE: 70 MMHG | OXYGEN SATURATION: 97 % | HEART RATE: 93 BPM

## 2021-02-12 PROCEDURE — 99072 ADDL SUPL MATRL&STAF TM PHE: CPT

## 2021-02-12 PROCEDURE — 99214 OFFICE O/P EST MOD 30 MIN: CPT

## 2021-02-12 NOTE — HISTORY OF PRESENT ILLNESS
[Mild Persistent] : mild persistent [Doing Well] : doing well [Adherent] : the patient is adherent with ~his/her~ medication regimen [Goals--Doing Well] : the patient is doing well with ~his/her~ asthma goals [Follow-Up - Routine Clinic] : a routine clinic follow-up of [Excess Weight] : excess weight [Dyspnea] : dyspnea [Low Calorie Diet] : low calorie diet [Fair Compliance] : fair compliance with treatment [Fair Tolerance] : fair tolerance of treatment [Poor Symptom Control] : poor symptom control [Dyslipidemia] : dyslipidemia [High] : high [Low Calorie] : low calorie [Well Balanced Diet] : well balanced meals [Excessive Daytime Sleepiness] : excessive daytime sleepiness [Snoring] : snoring [Unrefreshing Sleep] : unrefreshing sleep [Sleepy When Sedentary] : sleepy when sedentary [Currently Experiencing] : The patient is currently experiencing symptoms. [None] : The patient is not currently being treated for this problem [FreeTextEntry1] : Patient c/o SOBOE but is otherwise without associated respiratory complaints.She now p/w increased SOB and mild cough though without fevers or other respiratory complaints. She reports increased wheeze at times which resolves with Albuterol use but recently she has been c/o SOBOE with no improvement with Albuterol inhaler/nebulizer or with prednisone. She reports she has been quite sedentary which is likely contributing to her SOBOE.\par She was seen by cardiology with a reported negative w/u.\par

## 2021-02-12 NOTE — REVIEW OF SYSTEMS
[Cough] : cough [Chest Tightness] : chest tightness [Wheezing] : wheezing [Thyroid Problem] : thyroid problem [Fever] : no fever [Chills] : no chills [Dry Eyes] : no dryness of the eyes [Eye Irritation] : no ~T irritation of the eyes [Nasal Congestion] : no nasal congestion [Epistaxis] : no nosebleeds [Postnasal Drip] : no postnasal drip [Sinus Problems] : no sinus problems [Sputum] : not coughing up ~M sputum [Hemoptysis] : no hemoptysis [Dyspnea] : no dyspnea [Hypertension] : no ~T hypertension [Pleuritic Pain] : no pleuritic pain [Chest Discomfort] : no chest discomfort [Dysrhythmia] : no dysrhythmia [Murmurs] : no murmurs were heard [Palpitations] : no palpitations [Edema] : ~T edema was not present [Hay Fever] : no hay fever [Itchy Eyes] : no itching of ~T the eyes [Reflux] : no reflux [Nausea] : no nausea [Vomiting] : no vomiting [Constipation] : no constipation [Diarrhea] : no diarrhea [Abdominal Pain] : no abdominal pain [Dysuria] : no dysuria [Trauma] : no ~T physical trauma [Fracture] : no fracture [Anemia] : no anemia [Headache] : no headache [Dizziness] : no dizziness [Syncope] : no fainting [Numbness] : no numbness [Paralysis] : no paralysis was seen [Seizures] : no seizures [Depression] : no depression [Anxiety] : no anxiety [Diabetes] : no diabetes mellitus [Snoring] : no snoring [Witnessed Apneas] : demonstrated no ~M apnea [Nonrestorative Sleep] : restorative sleep

## 2021-02-12 NOTE — CONSULT LETTER
[Dear  ___] : Dear  [unfilled], [Consult Letter:] : I had the pleasure of evaluating your patient, [unfilled]. [Please see my note below.] : Please see my note below. [Consult Closing:] : Thank you very much for allowing me to participate in the care of this patient.  If you have any questions, please do not hesitate to contact me. [Sincerely,] : Sincerely, [FreeTextEntry3] : Missael Matias MD, FCCP, D. ABSM\par Pulmonary and Sleep Medicine\par [facility] at [place]

## 2021-02-12 NOTE — PROCEDURE
[FreeTextEntry1] : PFTs performed previously were essentially normal and was at baseline.\par PFTs 4/2/18 - normal FVC and mildly reduced FEV1 with an obstructive pattern but no significant BD response. Lung volumes and diffusion capacity are normal.\par Spirometry 3/20/19 - Minimally reduced FVC and mildly reduced FEV1 with an obstructive pattern but no significant BD response; slightly worse than previous\par PFTs 7/25/19 - Normal FVC and mildly reduced FEV1 without an obstructive pattern but has had obstruction in the past; lung volumes and diffusion capacity were normal. Overall, stable lung function\par PFTs 1/6/21 - Normal FVC and mildly reduced FEV1 without an obstructive pattern and with normal lung volumes and diffusion capacity; essentially at baseline\par

## 2021-02-12 NOTE — DISCUSSION/SUMMARY
[FreeTextEntry1] : \par #1. Consider ENT evaluation for PNDS and sinus complaints if symptoms persist\par #2. Continue cough medicine as needed\par #3. Continue Advair and Montelukast for mild asthma given reduction in spirometry and significant BD response in the past\par #4. ProAir as needed\par #5. Prednisone as needed though did not have relief with it\par #6. Nasonex/Flonase for PNDS as needed\par #7. Diet and exercise for weight loss\par #8. CXR to further evaluate SOBOE was clear\par #9. Pt s/p flu vaccine for 2020\par #10. Cardiology f/u for SOBOE though may be related to weight and deconditioning given reported negative w/u\par #11. Will obtain CTA to r/o PE as another cause of SOB given no other cardiac or pulmonary cause; will check D-dimer and BMP as well\par #12. Home PSG to evaluate for possible ANGEL\par #13. Reviewed risks of exposure and symptoms of Covid-19 virus, including how the virus is spread and precautions to avoid beltran virus.\par \par Patient's questions were answered and patient appears to understand these recommendations

## 2021-02-12 NOTE — END OF VISIT
[Time Spent: ___ minutes] : I have spent [unfilled] minutes of time on the encounter.
Patient states no history

## 2021-02-16 LAB
ANION GAP SERPL CALC-SCNC: 10 MMOL/L
BUN SERPL-MCNC: 18 MG/DL
CALCIUM SERPL-MCNC: 9.1 MG/DL
CHLORIDE SERPL-SCNC: 103 MMOL/L
CO2 SERPL-SCNC: 27 MMOL/L
CREAT SERPL-MCNC: 0.83 MG/DL
DEPRECATED D DIMER PPP IA-ACNC: 165 NG/ML DDU
GLUCOSE SERPL-MCNC: 119 MG/DL
POTASSIUM SERPL-SCNC: 4.2 MMOL/L
SODIUM SERPL-SCNC: 140 MMOL/L

## 2021-02-17 ENCOUNTER — OUTPATIENT (OUTPATIENT)
Dept: OUTPATIENT SERVICES | Facility: HOSPITAL | Age: 61
LOS: 1 days | End: 2021-02-17
Payer: COMMERCIAL

## 2021-02-17 ENCOUNTER — APPOINTMENT (OUTPATIENT)
Dept: CT IMAGING | Facility: CLINIC | Age: 61
End: 2021-02-17
Payer: COMMERCIAL

## 2021-02-17 DIAGNOSIS — Z00.8 ENCOUNTER FOR OTHER GENERAL EXAMINATION: ICD-10-CM

## 2021-02-17 DIAGNOSIS — R06.02 SHORTNESS OF BREATH: ICD-10-CM

## 2021-02-17 PROCEDURE — 71275 CT ANGIOGRAPHY CHEST: CPT

## 2021-02-17 PROCEDURE — 71275 CT ANGIOGRAPHY CHEST: CPT | Mod: 26

## 2021-02-19 ENCOUNTER — APPOINTMENT (OUTPATIENT)
Dept: PULMONOLOGY | Facility: CLINIC | Age: 61
End: 2021-02-19
Payer: COMMERCIAL

## 2021-02-19 VITALS
WEIGHT: 147 LBS | HEIGHT: 59 IN | SYSTOLIC BLOOD PRESSURE: 105 MMHG | OXYGEN SATURATION: 95 % | HEART RATE: 87 BPM | RESPIRATION RATE: 16 BRPM | DIASTOLIC BLOOD PRESSURE: 70 MMHG | BODY MASS INDEX: 29.64 KG/M2

## 2021-02-19 PROCEDURE — 99214 OFFICE O/P EST MOD 30 MIN: CPT

## 2021-02-19 PROCEDURE — 99072 ADDL SUPL MATRL&STAF TM PHE: CPT

## 2021-02-19 NOTE — DISCUSSION/SUMMARY
[FreeTextEntry1] : \par #1. Consider ENT evaluation for PNDS and sinus complaints if symptoms persist\par #2. Continue cough medicine as needed\par #3. Continue Advair and Montelukast for mild asthma given reduction in spirometry and significant BD response in the past\par #4. ProAir as needed\par #5. Prednisone as needed though did not have relief with it\par #6. Nasonex/Flonase for PNDS as needed\par #7. Diet and exercise for weight loss\par #8. CXR to further evaluate SOBOE was clear\par #9. Pt s/p flu vaccine for 2020\par #10. Cardiology f/u for SOBOE though may be related to weight and deconditioning given reported negative w/u\par #11. CTA and D-dimer were normal\par #12. Home PSG to evaluate for possible ANGEL\par #13. F/u after HST\par #14. PCP f/u for hyperglycemia noted on BMP\par #15. Reviewed risks of exposure and symptoms of Covid-19 virus, including how the virus is spread and precautions to avoid beltran virus.\par \par Patient's questions were answered and patient appears to understand these recommendations

## 2021-02-19 NOTE — CONSULT LETTER
[Dear  ___] : Dear  [unfilled], [Consult Letter:] : I had the pleasure of evaluating your patient, [unfilled]. [Please see my note below.] : Please see my note below. [Consult Closing:] : Thank you very much for allowing me to participate in the care of this patient.  If you have any questions, please do not hesitate to contact me. [Sincerely,] : Sincerely, [FreeTextEntry3] : Missael Matias MD, FCCP, D. ABSM\par Pulmonary and Sleep Medicine\par [facility] at [place]

## 2021-03-02 RX ORDER — KIT FOR THE PREPARATION OF TECHNETIUM TC99M SESTAMIBI 1 MG/5ML
INJECTION, POWDER, LYOPHILIZED, FOR SOLUTION PARENTERAL
Refills: 0 | Status: COMPLETED | OUTPATIENT
Start: 2021-03-02

## 2021-03-02 RX ADMIN — KIT FOR THE PREPARATION OF TECHNETIUM TC99M SESTAMIBI 0: 1 INJECTION, POWDER, LYOPHILIZED, FOR SOLUTION PARENTERAL at 00:00

## 2021-04-02 LAB
ALBUMIN SERPL ELPH-MCNC: 4.2 G/DL
ALP BLD-CCNC: 71 U/L
ALT SERPL-CCNC: 21 U/L
ANION GAP SERPL CALC-SCNC: 10 MMOL/L
AST SERPL-CCNC: 15 U/L
BASOPHILS # BLD AUTO: 0.06 K/UL
BASOPHILS NFR BLD AUTO: 0.9 %
BILIRUB SERPL-MCNC: 0.3 MG/DL
BUN SERPL-MCNC: 18 MG/DL
CALCIUM SERPL-MCNC: 9.1 MG/DL
CHLORIDE SERPL-SCNC: 108 MMOL/L
CHOLEST SERPL-MCNC: 167 MG/DL
CO2 SERPL-SCNC: 26 MMOL/L
CREAT SERPL-MCNC: 0.71 MG/DL
EOSINOPHIL # BLD AUTO: 0.37 K/UL
EOSINOPHIL NFR BLD AUTO: 5.6 %
GLUCOSE SERPL-MCNC: 105 MG/DL
HCT VFR BLD CALC: 38.7 %
HDLC SERPL-MCNC: 60 MG/DL
HGB BLD-MCNC: 12.1 G/DL
IMM GRANULOCYTES NFR BLD AUTO: 0.2 %
LDLC SERPL CALC-MCNC: 81 MG/DL
LYMPHOCYTES # BLD AUTO: 2.76 K/UL
LYMPHOCYTES NFR BLD AUTO: 41.6 %
MAGNESIUM SERPL-MCNC: 2.3 MG/DL
MAN DIFF?: NORMAL
MCHC RBC-ENTMCNC: 27 PG
MCHC RBC-ENTMCNC: 31.3 GM/DL
MCV RBC AUTO: 86.4 FL
MONOCYTES # BLD AUTO: 0.5 K/UL
MONOCYTES NFR BLD AUTO: 7.5 %
NEUTROPHILS # BLD AUTO: 2.94 K/UL
NEUTROPHILS NFR BLD AUTO: 44.2 %
NONHDLC SERPL-MCNC: 107 MG/DL
NT-PROBNP SERPL-MCNC: 10 PG/ML
PLATELET # BLD AUTO: 215 K/UL
POTASSIUM SERPL-SCNC: 4.4 MMOL/L
PROT SERPL-MCNC: 6.7 G/DL
RBC # BLD: 4.48 M/UL
RBC # FLD: 13.1 %
SODIUM SERPL-SCNC: 144 MMOL/L
TRIGL SERPL-MCNC: 129 MG/DL
TSH SERPL-ACNC: 4.55 UIU/ML
WBC # FLD AUTO: 6.64 K/UL

## 2021-04-13 ENCOUNTER — RX CHANGE (OUTPATIENT)
Age: 61
End: 2021-04-13

## 2021-05-12 ENCOUNTER — NON-APPOINTMENT (OUTPATIENT)
Age: 61
End: 2021-05-12

## 2021-05-12 ENCOUNTER — APPOINTMENT (OUTPATIENT)
Dept: CARDIOLOGY | Facility: CLINIC | Age: 61
End: 2021-05-12
Payer: COMMERCIAL

## 2021-05-12 ENCOUNTER — RX CHANGE (OUTPATIENT)
Age: 61
End: 2021-05-12

## 2021-05-12 VITALS
WEIGHT: 143 LBS | RESPIRATION RATE: 16 BRPM | DIASTOLIC BLOOD PRESSURE: 64 MMHG | SYSTOLIC BLOOD PRESSURE: 99 MMHG | BODY MASS INDEX: 28.83 KG/M2 | OXYGEN SATURATION: 95 % | HEIGHT: 59 IN | TEMPERATURE: 98.4 F | HEART RATE: 90 BPM

## 2021-05-12 PROCEDURE — 93000 ELECTROCARDIOGRAM COMPLETE: CPT

## 2021-05-12 PROCEDURE — 99214 OFFICE O/P EST MOD 30 MIN: CPT

## 2021-05-12 PROCEDURE — 99072 ADDL SUPL MATRL&STAF TM PHE: CPT

## 2021-05-12 NOTE — ASSESSMENT
[FreeTextEntry1] : ECG: SR, no significant ST-T abnormalities and normal intervals \par \par  HDL 60 LDL 81  (4/2021) \par  HDL 78   (2/2020) \par BNP 10 (4/2021) \par CMP unremarkable (4/2021) \par TSH 4.55 (4/2021) \par \par CAROTID 2/2021:\par 1. 1-49% ICA stenosis bilaterally\par 2. Tortuous vessel, cannot exclude higher degree stenosis distal LYLA \par 3. Antegrade fllow in the vertebral arteries bilaterally \par \par ECHO 2/2021:\par 1. Normal LV size, systolic and diastolic function. EF 60-65%\par 2. Normal RV/LA/RA \par 3. Trivial AI\par 4. Mild MR\par 5. Mild TR, RVSP 25mmHg\par \par PHARM NUCLEAR STRESS TEST 1/2021:\par 1. Negative for ischemia/infarct\par 2. EF 72%\par 3. BP hypertensive baseline, normal response

## 2021-05-12 NOTE — HISTORY OF PRESENT ILLNESS
[FreeTextEntry1] : Patient is a 61yo F former smoker, strong family h/o CAD, obesity, asthma here for cardiac follow up of dyspnea. \par Had cardiac work up earlier this year for increasing dyspnea and was negative. Follows with pulmonary regularly as well. found to have moderate carotid stenosis and on ASA/statin now. Had recent eye surgery, found to have cyst. HAs drainage tube and has follow up next month. HAS not gotten ANGEL study yet. Also put on valsartan. \par \par Lives with  and teenage kids. 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 \par \par ROS: GI and  negative

## 2021-05-12 NOTE — DISCUSSION/SUMMARY
[FreeTextEntry1] : Patient is a 59yo F former smoker, family h/o CAD, obesity, asthma here for cardiac follow up \par -Nuclear stress testing 2/2021 without ischemia\par -Echo 2/2021 with preserved BiV function, mild MR needs surveillance only\par -Mild to moderate carotid stenosis, cannot exclude higher degree on recent duplex. Recommend med management and surveillance. \par -Dyspnea appears non-cardiac, regular pulm follow up and diet/exercise/weight loss\par -BP well controlled, ECG normal today \par \par \par 1. Follow up 2 months, recheck carotid then\par 2. Recommend aggressive diet and lifestyle modifications, counselled on weight loss to help symptoms\par 3. Regular follow up with PMD and pulmonary\par 4. Continue current antihypertensives, blood pressure is well controlled. Continue Valsartan\par 5. Continue ASA/statin, lipids much better controlled now\par 6. Discuss with primary regarding changing thyroid replacement, TSH mildly high \par 7. Recommended COVID vaccination,  had COVID but patient has not

## 2021-07-06 ENCOUNTER — APPOINTMENT (OUTPATIENT)
Dept: CARDIOLOGY | Facility: CLINIC | Age: 61
End: 2021-07-06
Payer: COMMERCIAL

## 2021-07-06 PROCEDURE — 99072 ADDL SUPL MATRL&STAF TM PHE: CPT

## 2021-07-06 PROCEDURE — 93880 EXTRACRANIAL BILAT STUDY: CPT

## 2021-07-20 ENCOUNTER — APPOINTMENT (OUTPATIENT)
Dept: CARDIOLOGY | Facility: CLINIC | Age: 61
End: 2021-07-20
Payer: COMMERCIAL

## 2021-07-20 ENCOUNTER — NON-APPOINTMENT (OUTPATIENT)
Age: 61
End: 2021-07-20

## 2021-07-20 VITALS
RESPIRATION RATE: 16 BRPM | WEIGHT: 143 LBS | BODY MASS INDEX: 28.83 KG/M2 | HEIGHT: 59 IN | SYSTOLIC BLOOD PRESSURE: 140 MMHG | OXYGEN SATURATION: 98 % | HEART RATE: 78 BPM | DIASTOLIC BLOOD PRESSURE: 80 MMHG

## 2021-07-20 PROCEDURE — 93000 ELECTROCARDIOGRAM COMPLETE: CPT

## 2021-07-20 PROCEDURE — 99214 OFFICE O/P EST MOD 30 MIN: CPT

## 2021-07-20 PROCEDURE — 99072 ADDL SUPL MATRL&STAF TM PHE: CPT

## 2021-07-20 RX ORDER — UBIDECARENONE/VIT E ACET 100MG-5
CAPSULE ORAL
Refills: 0 | Status: ACTIVE | COMMUNITY

## 2021-07-20 RX ORDER — MULTIVIT-MIN/IRON/FOLIC ACID/K 18-600-40
CAPSULE ORAL
Refills: 0 | Status: ACTIVE | COMMUNITY

## 2021-07-20 NOTE — HISTORY OF PRESENT ILLNESS
[FreeTextEntry1] : Patient is a 61yo F former smoker, strong family h/o CAD, obesity, asthma here for cardiac follow up of dyspnea. \par Had cardiac work up earlier this year for increasing dyspnea and was negative. Follows with pulmonary regularly as well. found to have moderate carotid stenosis and on ASA/statin now. 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

## 2021-07-20 NOTE — DISCUSSION/SUMMARY
[FreeTextEntry1] : Patient is a 61yo F former smoker, family h/o CAD, obesity, asthma here for cardiac follow up \par -Nuclear stress testing 2/2021 without ischemia\par -Echo 2/2021 with preserved BiV function, mild MR needs surveillance only\par -Mild to moderate carotid stenosis, cannot exclude higher degree distal disease due to toruous vessels, also with borderline 50% LICA stenosis. No symptoms. Recommend med management and surveillance. \par -Dyspnea appears non-cardiac, regular pulm follow up and diet/exercise/weight loss\par -BP has been well controlled but mildly elevated today. Diet/exercise prior to increasing ARB\par \par \par 1. Continue medical management of carotid stenosis, repeat study in 6 months. YEarly after if stable\par 2. Recommend aggressive diet and lifestyle modifications, counselled on weight loss to help symptoms\par 3. Regular follow up with PMD and pulmonary\par 4. Continue current antihypertensives, recheck BP with PMD next month or september\par 5. Continue ASA/statin, lipids much better controlled now\par 6. Follow up 6 months

## 2021-07-20 NOTE — PHYSICAL EXAM
[General Appearance - Well Developed] : well developed [General Appearance - Well Nourished] : well nourished [Normal Conjunctiva] : the conjunctiva exhibited no abnormalities [Normal Oropharynx] : normal oropharynx [Normal Jugular Venous V Waves Present] : normal jugular venous V waves present [Respiration, Rhythm And Depth] : normal respiratory rhythm and effort [Auscultation Breath Sounds / Voice Sounds] : lungs were clear to auscultation bilaterally [Heart Rate And Rhythm] : heart rate and rhythm were normal [Heart Sounds] : normal S1 and S2 [Edema] : no peripheral edema present [Abdomen Tenderness] : non-tender [Abdomen Soft] : soft [Abnormal Walk] : normal gait [Nail Clubbing] : no clubbing of the fingernails [Cyanosis, Localized] : no localized cyanosis [Oriented To Time, Place, And Person] : oriented to person, place, and time [Skin Color & Pigmentation] : normal skin color and pigmentation [FreeTextEntry1] : II/VI early systolic murmur at the base, ? radiation to the neck

## 2021-07-20 NOTE — ASSESSMENT
[FreeTextEntry1] : ECG: SR, no significant ST-T abnormalities and normal intervals \par \par  HDL 60 LDL 81  (4/2021) \par  HDL 78   (2/2020) \par BNP 10 (4/2021) \par CMP unremarkable (4/2021) \par TSH 4.55 (4/2021) \par \par CAROTID 7/2021:\par 1. Borderline 50% stenosis left proximal ICA\par 2. 1-49% LYLA stenosis\par 3. Distal right ICA tortuous, cannot exclude higher degree stenosis\par 4. Antegrade flow in the vertebral arteries bilaterally \par \par CAROTID 2/2021:\par 1. 1-49% ICA stenosis bilaterally\par 2. Tortuous vessel, cannot exclude higher degree stenosis distal LYLA \par 3. Antegrade fllow in the vertebral arteries bilaterally \par \par ECHO 2/2021:\par 1. Normal LV size, systolic and diastolic function. EF 60-65%\par 2. Normal RV/LA/RA \par 3. Trivial AI\par 4. Mild MR\par 5. Mild TR, RVSP 25mmHg\par \par PHARM NUCLEAR STRESS TEST 1/2021:\par 1. Negative for ischemia/infarct\par 2. EF 72%\par 3. BP hypertensive baseline, normal response

## 2022-02-17 ENCOUNTER — APPOINTMENT (OUTPATIENT)
Dept: PULMONOLOGY | Facility: CLINIC | Age: 62
End: 2022-02-17
Payer: COMMERCIAL

## 2022-02-17 ENCOUNTER — NON-APPOINTMENT (OUTPATIENT)
Age: 62
End: 2022-02-17

## 2022-02-17 VITALS — BODY MASS INDEX: 26.86 KG/M2 | WEIGHT: 133 LBS

## 2022-02-17 VITALS — SYSTOLIC BLOOD PRESSURE: 130 MMHG | OXYGEN SATURATION: 95 % | DIASTOLIC BLOOD PRESSURE: 80 MMHG | HEART RATE: 100 BPM

## 2022-02-17 PROCEDURE — 99215 OFFICE O/P EST HI 40 MIN: CPT

## 2022-02-17 NOTE — CONSULT LETTER
[Dear  ___] : Dear  [unfilled], [Consult Letter:] : I had the pleasure of evaluating your patient, [unfilled]. [Please see my note below.] : Please see my note below. [Consult Closing:] : Thank you very much for allowing me to participate in the care of this patient.  If you have any questions, please do not hesitate to contact me. [Sincerely,] : Sincerely, [FreeTextEntry3] : Missael Matias MD, FCCP, D. ABSM\par Pulmonary and Sleep Medicine\par [facility] at [place]

## 2022-02-17 NOTE — REASON FOR VISIT
[Follow-Up] : a follow-up visit [Asthma] : asthma [Shortness of Breath] : shortness of breath [Obesity] : obesity

## 2022-02-17 NOTE — DISCUSSION/SUMMARY
[FreeTextEntry1] : \par #1. Consider ENT evaluation for PNDS and sinus complaints if symptoms persist\par #2. Continue cough medicine as needed\par #3. Continue Advair and Montelukast for mild asthma given reduction in spirometry and significant BD response in the past\par #4. ProAir as needed\par #5. Prednisone as needed though did not have relief with it in the past\par #6. Nasonex/Flonase for PNDS as needed\par #7. Diet and exercise for weight loss\par #8. CXR to further evaluate SOBOE was clear\par #9. Pt s/p J&J Covid vaccine and booster; rec flu vaccine\par #10. Cardiology f/u for SOBOE though may be related to weight and deconditioning given reported negative w/u\par #11. Prior CTA from 2/17/21 was unremarkable\par #12. Home PSG to evaluate for possible ANGEL though reports improved symptoms with weight loss so would like to hold off for now\par #13. F/u in 1-2 months with PFTs and CXR given recent Covid infection from early 1/2022\par #14. Pt appears to have recovered from her Covid infection from \par #15. Reviewed risks of exposure and symptoms of Covid-19 virus, including how the virus is spread and precautions to avoid beltran virus.\par \par The patient expressed understanding and agreement with the above recommendations/plan and accepts responsibility to be compliant with recommended testing, therapies, and f/u visits.\par All relevant questions and concerns were addressed.

## 2022-02-17 NOTE — PHYSICAL EXAM
[General Appearance - Well Developed] : well developed [General Appearance - In No Acute Distress] : no acute distress [Normal Conjunctiva] : the conjunctiva exhibited no abnormalities [Normal Oropharynx] : normal oropharynx [Neck Appearance] : the appearance of the neck was normal [Heart Rate And Rhythm] : heart rate and rhythm were normal [Heart Sounds] : normal S1 and S2 [Murmurs] : no murmurs present [Edema] : no peripheral edema present [] : no respiratory distress [Respiration, Rhythm And Depth] : normal respiratory rhythm and effort [Exaggerated Use Of Accessory Muscles For Inspiration] : no accessory muscle use [Auscultation Breath Sounds / Voice Sounds] : lungs were clear to auscultation bilaterally [Abdomen Soft] : soft [Abnormal Walk] : normal gait [Nail Clubbing] : no clubbing of the fingernails [Cyanosis, Localized] : no localized cyanosis [Oriented To Time, Place, And Person] : oriented to person, place, and time [No Acute Distress] : no acute distress [Well Nourished] : well nourished [Well Developed] : well developed [Normal Appearance] : normal appearance [Supple] : supple [Normal Rate/Rhythm] : normal rate/rhythm [Normal S1, S2] : normal s1, s2 [No Murmurs] : no murmurs [No Acc Muscle Use] : no acc muscle use [No Resp Distress] : no resp distress [Normal Rhythm and Effort] : normal rhythm and effort [Clear to Auscultation Bilaterally] : clear to auscultation bilaterally [No Abnormalities] : no abnormalities [Benign] : benign [Not Tender] : not tender [Soft] : soft [No Clubbing] : no clubbing [No Edema] : no edema [No Focal Deficits] : no focal deficits [Oriented x3] : oriented x3 [TextBox_2] : Pt is overweight [FreeTextEntry1] : No abnormalities

## 2022-02-17 NOTE — HISTORY OF PRESENT ILLNESS
[Mild Persistent] : mild persistent [Doing Well] : doing well [Adherent] : the patient is adherent with ~his/her~ medication regimen [Goals--Doing Well] : the patient is doing well with ~his/her~ asthma goals [Follow-Up - Routine Clinic] : a routine clinic follow-up of [Excess Weight] : excess weight [Dyspnea] : dyspnea [Low Calorie Diet] : low calorie diet [Fair Compliance] : fair compliance with treatment [Fair Tolerance] : fair tolerance of treatment [Poor Symptom Control] : poor symptom control [Dyslipidemia] : dyslipidemia [High] : high [Low Calorie] : low calorie [Well Balanced Diet] : well balanced meals [Excessive Daytime Sleepiness] : excessive daytime sleepiness [Unrefreshing Sleep] : unrefreshing sleep [Sleepy When Sedentary] : sleepy when sedentary [Currently Experiencing] : The patient is currently experiencing symptoms. [None] : The patient is not currently being treated for this problem [Never] : never [Former] : former [TextBox_4] : Patient c/o SOBOE but is otherwise without associated respiratory complaints.She now p/w increased SOB and mild cough though without fevers or other respiratory complaints. She reports increased wheeze at times which resolves with Albuterol use but recently she has been c/o SOBOE with no improvement with Albuterol inhaler/nebulizer or with prednisone. She reports she has been quite sedentary which is likely contributing to her SOBOE.\par She was seen by cardiology with a reported negative w/u.\par Pt reports URI symptoms in later Dec 2021 but Covid swab was negative. She had persistent URI symptoms so was found to have Covid infection in early Jan 2022 when she felt worse with body aches, fatigue, h/a, nausea, cough, no fevers but with chills, loss of taste and smell. She is now essentially back to normal but with residual fatigue. She was treated with Z-riley and prednisone taper.\par Pt was last seen one year ago.\par  [TextBox_11] : .1 [TextBox_13] : 5 [YearQuit] : 1990 [TextBox_27] : remote hx [FreeTextEntry1] : \par

## 2022-02-25 ENCOUNTER — OUTPATIENT (OUTPATIENT)
Dept: OUTPATIENT SERVICES | Facility: HOSPITAL | Age: 62
LOS: 1 days | End: 2022-02-25
Payer: COMMERCIAL

## 2022-02-25 DIAGNOSIS — R06.02 SHORTNESS OF BREATH: ICD-10-CM

## 2022-02-25 PROCEDURE — 71046 X-RAY EXAM CHEST 2 VIEWS: CPT | Mod: 26

## 2022-03-01 ENCOUNTER — APPOINTMENT (OUTPATIENT)
Dept: CARDIOLOGY | Facility: CLINIC | Age: 62
End: 2022-03-01
Payer: COMMERCIAL

## 2022-03-01 PROCEDURE — 93880 EXTRACRANIAL BILAT STUDY: CPT

## 2022-03-29 LAB — SARS-COV-2 N GENE NPH QL NAA+PROBE: NOT DETECTED

## 2022-03-31 ENCOUNTER — APPOINTMENT (OUTPATIENT)
Dept: PULMONOLOGY | Facility: CLINIC | Age: 62
End: 2022-03-31
Payer: COMMERCIAL

## 2022-03-31 VITALS — BODY MASS INDEX: 28.34 KG/M2 | HEIGHT: 58 IN | WEIGHT: 135 LBS

## 2022-03-31 VITALS
HEART RATE: 87 BPM | OXYGEN SATURATION: 97 % | RESPIRATION RATE: 16 BRPM | DIASTOLIC BLOOD PRESSURE: 70 MMHG | SYSTOLIC BLOOD PRESSURE: 130 MMHG

## 2022-03-31 PROCEDURE — 99214 OFFICE O/P EST MOD 30 MIN: CPT | Mod: 25

## 2022-03-31 PROCEDURE — 94727 GAS DIL/WSHOT DETER LNG VOL: CPT

## 2022-03-31 PROCEDURE — 85018 HEMOGLOBIN: CPT | Mod: QW

## 2022-03-31 PROCEDURE — 94729 DIFFUSING CAPACITY: CPT

## 2022-03-31 PROCEDURE — 94010 BREATHING CAPACITY TEST: CPT

## 2022-03-31 RX ORDER — TOBRAMYCIN AND DEXAMETHASONE 3; 1 MG/ML; MG/ML
0.3-0.1 SUSPENSION/ DROPS OPHTHALMIC
Qty: 5 | Refills: 0 | Status: DISCONTINUED | COMMUNITY
Start: 2021-06-03 | End: 2022-03-31

## 2022-03-31 NOTE — REASON FOR VISIT
[Follow-Up] : a follow-up visit [Asthma] : asthma [Shortness of Breath] : shortness of breath [Obesity] : obesity [TextBox_44] : Prior Covid infection

## 2022-03-31 NOTE — HISTORY OF PRESENT ILLNESS
[Never] : never [Former] : former [Mild Persistent] : mild persistent [Doing Well] : doing well [Adherent] : the patient is adherent with ~his/her~ medication regimen [Goals--Doing Well] : the patient is doing well with ~his/her~ asthma goals [Follow-Up - Routine Clinic] : a routine clinic follow-up of [Excess Weight] : excess weight [Dyspnea] : dyspnea [Low Calorie Diet] : low calorie diet [Fair Compliance] : fair compliance with treatment [Fair Tolerance] : fair tolerance of treatment [Poor Symptom Control] : poor symptom control [Dyslipidemia] : dyslipidemia [High] : high [Low Calorie] : low calorie [Well Balanced Diet] : well balanced meals [Excessive Daytime Sleepiness] : excessive daytime sleepiness [Unrefreshing Sleep] : unrefreshing sleep [Sleepy When Sedentary] : sleepy when sedentary [Currently Experiencing] : The patient is currently experiencing symptoms. [None] : The patient is not currently being treated for this problem [TextBox_4] : Patient c/o SOBOE but is otherwise without associated respiratory complaints.She now p/w increased SOB and mild cough though without fevers or other respiratory complaints. She reports increased wheeze at times which resolves with Albuterol use but recently she has been c/o SOBOE with no improvement with Albuterol inhaler/nebulizer or with prednisone. She reports she has been quite sedentary which is likely contributing to her SOBOE.\par She was seen by cardiology with a reported negative w/u.\par Pt reports URI symptoms in later Dec 2021 but Covid swab was negative. She had persistent URI symptoms so was found to have Covid infection in early Jan 2022 when she felt worse with body aches, fatigue, h/a, nausea, cough, no fevers but with chills, loss of taste and smell. She is now essentially back to normal but with residual fatigue. She was treated with Z-riley and prednisone taper.\par Pt was last seen one year ago.\par  [TextBox_13] : 5 [TextBox_11] : .1 [YearQuit] : 1990 [TextBox_27] : remote hx

## 2022-03-31 NOTE — DISCUSSION/SUMMARY
[FreeTextEntry1] : \par #1. Consider ENT evaluation for PNDS and sinus complaints if symptoms persist\par #2. Continue cough medicine as needed\par #3. Continue Advair and Montelukast for mild asthma given reduction in spirometry and significant BD response in the past\par #4. ProAir as needed\par #5. Prednisone as needed though did not have relief with it in the past\par #6. Nasonex/Flonase for PNDS as needed\par #7. Diet and exercise for weight loss\par #8. CXR to further evaluate SOBOE was clear\par #9. Pt s/p J&J Covid vaccine and booster; rec flu vaccine\par #10. Cardiology f/u for SOBOE though may be related to weight and deconditioning given reported negative w/u\par #11. Prior CTA from 2/17/21 was unremarkable\par #12. Home PSG to evaluate for possible ANGEL though reports improved symptoms with weight loss so would like to hold off for now\par #13. PFTs and CXR given recent Covid infection from early 1/2022 were essentially normal\par #14. Pt appears to have recovered from her Covid infection from early 1/2022\par #15. F/u 3-4 months \par #16. Reviewed risks of exposure and symptoms of Covid-19 virus, including how the virus is spread and precautions to avoid beltran virus.\par \par The patient expressed understanding and agreement with the above recommendations/plan and accepts responsibility to be compliant with recommended testing, therapies, and f/u visits.\par All relevant questions and concerns were addressed.

## 2022-03-31 NOTE — REVIEW OF SYSTEMS
[Nasal Congestion] : nasal congestion [Postnasal Drip] : postnasal drip [Sinus Problems] : sinus problems [SOB on Exertion] : sob on exertion [Seasonal Allergies] : seasonal allergies [Negative] : Endocrine [Back Pain] : no back pain [Thyroid Problem] : no thyroid problem

## 2022-03-31 NOTE — CONSULT LETTER
[Dear  ___] : Dear  [unfilled], [Consult Letter:] : I had the pleasure of evaluating your patient, [unfilled]. [Please see my note below.] : Please see my note below. [Consult Closing:] : Thank you very much for allowing me to participate in the care of this patient.  If you have any questions, please do not hesitate to contact me. [Sincerely,] : Sincerely, [FreeTextEntry3] : Missael Matias MD, FCCP, D. ABSM\par Pulmonary and Sleep Medicine\par [facility] at [place]

## 2022-03-31 NOTE — PROCEDURE
[FreeTextEntry1] : PFTs performed previously were essentially normal and was at baseline.\par PFTs 4/2/18 - normal FVC and mildly reduced FEV1 with an obstructive pattern but no significant BD response. Lung volumes and diffusion capacity are normal.\par Spirometry 3/20/19 - Minimally reduced FVC and mildly reduced FEV1 with an obstructive pattern but no significant BD response; slightly worse than previous\par PFTs 7/25/19 - Normal FVC and mildly reduced FEV1 without an obstructive pattern but has had obstruction in the past; lung volumes and diffusion capacity were normal. Overall, stable lung function\par PFTs 1/6/21 - Normal FVC and mildly reduced FEV1 without an obstructive pattern and with normal lung volumes and diffusion capacity; essentially at baseline\par PFTs 3/31/22 - Normal FVC and borderline FEV1 without an obstructive pattern; normal lung volumes and diffusion capacity\par

## 2022-04-25 ENCOUNTER — APPOINTMENT (OUTPATIENT)
Dept: CARDIOLOGY | Facility: CLINIC | Age: 62
End: 2022-04-25

## 2022-04-28 ENCOUNTER — APPOINTMENT (OUTPATIENT)
Dept: CARDIOLOGY | Facility: CLINIC | Age: 62
End: 2022-04-28
Payer: COMMERCIAL

## 2022-04-28 ENCOUNTER — NON-APPOINTMENT (OUTPATIENT)
Age: 62
End: 2022-04-28

## 2022-04-28 VITALS
BODY MASS INDEX: 28.76 KG/M2 | SYSTOLIC BLOOD PRESSURE: 122 MMHG | HEIGHT: 58 IN | DIASTOLIC BLOOD PRESSURE: 78 MMHG | HEART RATE: 99 BPM | RESPIRATION RATE: 16 BRPM | OXYGEN SATURATION: 96 % | WEIGHT: 137 LBS

## 2022-04-28 DIAGNOSIS — Z82.49 FAMILY HISTORY OF ISCHEMIC HEART DISEASE AND OTHER DISEASES OF THE CIRCULATORY SYSTEM: ICD-10-CM

## 2022-04-28 PROCEDURE — 99214 OFFICE O/P EST MOD 30 MIN: CPT

## 2022-04-28 PROCEDURE — 93000 ELECTROCARDIOGRAM COMPLETE: CPT

## 2022-04-28 NOTE — DISCUSSION/SUMMARY
[FreeTextEntry1] : Patient is a 62yo F former smoker, family h/o CAD, obesity, asthma here for cardiac follow up \par -Nuclear stress testing 2/2021 without ischemia\par -Echo 2/2021 with preserved BiV function, mild MR needs surveillance only\par -Stable carotid stenosis, 50-69% LICA last checked 3/2022\par \par -Has lost weight since last year and seen by pulm with normal PFT and CXR. ? etiology of SOB, states BW unremarkable and will be repeating with PMD soon. \par \par \par 1. Continue medical management of carotid stenosis, on ASA statin. REpeat lipids with PMD planned \par 2. Recommend aggressive diet and lifestyle modifications, counselled on weight loss\par 3. Echo and cardiac CTA to evaluate continued dyspnea despite weight loss, normal pulm function. Increased risk CAD given carotid stenosis\par 4. Follow up after testing

## 2022-04-28 NOTE — ASSESSMENT
[FreeTextEntry1] : ECG: SR, no significant ST-T abnormalities and normal intervals, PRWP  \par \par  HDL 60 LDL 81  (4/2021) \par  HDL 78   (2/2020) \par BNP 10 (4/2021) \par CMP unremarkable (4/2021) \par TSH 4.55 (4/2021) \par \par Carotid 3/2022:\par 1. 50-69% LICA stenosis\par 2. 1-49% LYLA stenosis\par 3. Antegrade flow in the vertebral arteries bilaterally \par 4. Elevated velocities due to tortuosity on the right \par \par CAROTID 7/2021:\par 1. Borderline 50% stenosis left proximal ICA\par 2. 1-49% LYLA stenosis\par 3. Distal right ICA tortuous, cannot exclude higher degree stenosis\par 4. Antegrade flow in the vertebral arteries bilaterally \par \par CAROTID 2/2021:\par 1. 1-49% ICA stenosis bilaterally\par 2. Tortuous vessel, cannot exclude higher degree stenosis distal LYLA \par 3. Antegrade fllow in the vertebral arteries bilaterally \par \par ECHO 2/2021:\par 1. Normal LV size, systolic and diastolic function. EF 60-65%\par 2. Normal RV/LA/RA \par 3. Trivial AI\par 4. Mild MR\par 5. Mild TR, RVSP 25mmHg\par \par PHARM NUCLEAR STRESS TEST 1/2021:\par 1. Negative for ischemia/infarct\par 2. EF 72%\par 3. BP hypertensive baseline, normal response

## 2022-04-28 NOTE — HISTORY OF PRESENT ILLNESS
[FreeTextEntry1] : Patient is a 62yo F former smoker, strong family h/o CAD, obesity, carotid stenosis, asthma here for cardiac follow up. Follows with pulmonary regularly as well. found to have moderate carotid stenosis and on ASA/statin now. Patient denies PND/orthopnea/edema/palpitations/syncope/claudication. Had COVID in January and has recovered. Got COVID booster vaccine after illness. STill with dyspnea on exertion, particularly going up stairs. \par \par Lives with  and teenage kids. 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

## 2022-05-11 ENCOUNTER — APPOINTMENT (OUTPATIENT)
Dept: CARDIOLOGY | Facility: CLINIC | Age: 62
End: 2022-05-11
Payer: COMMERCIAL

## 2022-05-11 PROCEDURE — 93306 TTE W/DOPPLER COMPLETE: CPT

## 2022-05-16 ENCOUNTER — APPOINTMENT (OUTPATIENT)
Dept: CT IMAGING | Facility: CLINIC | Age: 62
End: 2022-05-16

## 2022-05-17 ENCOUNTER — APPOINTMENT (OUTPATIENT)
Dept: CT IMAGING | Facility: CLINIC | Age: 62
End: 2022-05-17
Payer: COMMERCIAL

## 2022-05-17 ENCOUNTER — OUTPATIENT (OUTPATIENT)
Dept: OUTPATIENT SERVICES | Facility: HOSPITAL | Age: 62
LOS: 1 days | End: 2022-05-17
Payer: COMMERCIAL

## 2022-05-17 DIAGNOSIS — Z00.8 ENCOUNTER FOR OTHER GENERAL EXAMINATION: ICD-10-CM

## 2022-05-17 PROCEDURE — 75574 CT ANGIO HRT W/3D IMAGE: CPT | Mod: 26

## 2022-05-17 PROCEDURE — 75574 CT ANGIO HRT W/3D IMAGE: CPT

## 2022-05-20 ENCOUNTER — NON-APPOINTMENT (OUTPATIENT)
Age: 62
End: 2022-05-20

## 2022-05-26 ENCOUNTER — APPOINTMENT (OUTPATIENT)
Dept: CARDIOLOGY | Facility: CLINIC | Age: 62
End: 2022-05-26
Payer: COMMERCIAL

## 2022-05-26 VITALS
DIASTOLIC BLOOD PRESSURE: 64 MMHG | BODY MASS INDEX: 29.18 KG/M2 | HEART RATE: 81 BPM | HEIGHT: 58 IN | OXYGEN SATURATION: 98 % | WEIGHT: 139 LBS | SYSTOLIC BLOOD PRESSURE: 118 MMHG | RESPIRATION RATE: 16 BRPM

## 2022-05-26 PROCEDURE — 99214 OFFICE O/P EST MOD 30 MIN: CPT

## 2022-05-26 NOTE — HISTORY OF PRESENT ILLNESS
[FreeTextEntry1] : Patient is a 62yo F former smoker, strong family h/o CAD, obesity, carotid stenosis, asthma here for cardiac follow up. Follows with pulmonary regularly as well. found to have moderate carotid stenosis and on ASA/statin now. Patient denies PND/orthopnea/edema/palpitations/syncope/claudication. Had COVID in January and has recovered. Got COVID booster vaccine after illness. STill with dyspnea on exertion, particularly going up stairs. Patient underwent cardiac testing after last visit. 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

## 2022-05-26 NOTE — DISCUSSION/SUMMARY
[FreeTextEntry1] : Patient is a 60yo F former smoker, family h/o CAD, obesity, asthma here for cardiac follow up \par -Nuclear stress testing 2/2021 without ischemia, CTA with  without obstructive disease\par -Echo 5/2022 with preserved BiV function, no significant valve disease \par -Stable carotid stenosis, 50-69% LICA last checked 3/2022\par \par -Has lost weight since last year and seen by pulm with normal PFT and CXR. ? etiology of SOB, states BW unremarkable and will be repeating with PMD soon. FOllow up PMD and pulmonary\par \par \par 1. Continue medical management of carotid stenosis, on ASA statin. Increase rosuvastatin to 20mg qhs and recheck lipids with PMD GOal LDL < 70\par 2. Recommend aggressive diet and lifestyle modifications, counselled on weight loss\par 3. Continue current antihypertensives, blood pressure is well controlled \par 4. Follow up 6 months and recheck carotid then \par 5. REgular PMD/pulm follow up to evaluate dyspnea, symptoms non-cardiac

## 2022-05-26 NOTE — ASSESSMENT
[FreeTextEntry1] : \par \par  HDL 60 LDL 81  (4/2021) \par  HDL 78   (2/2020) \par BNP 10 (4/2021) \par CMP unremarkable (4/2021) \par TSH 4.55 (4/2021) \par \par CARDIAC CTA 5/2022:\par 1.  \par 2. LM 29, LAD 65 LCX 13 RCA 3\par 3. < 25% stenosis LM \par \par ECHO 5/2022:\par 1. Normal LV size and function, EF 65-70%\par 2. Normal RV/LA/RA \par 3. No clinically significant valvular disease \par \par \par Carotid 3/2022:\par 1. 50-69% LICA stenosis\par 2. 1-49% LYLA stenosis\par 3. Antegrade flow in the vertebral arteries bilaterally \par 4. Elevated velocities due to tortuosity on the right \par \par CAROTID 7/2021:\par 1. Borderline 50% stenosis left proximal ICA\par 2. 1-49% LYLA stenosis\par 3. Distal right ICA tortuous, cannot exclude higher degree stenosis\par 4. Antegrade flow in the vertebral arteries bilaterally \par \par CAROTID 2/2021:\par 1. 1-49% ICA stenosis bilaterally\par 2. Tortuous vessel, cannot exclude higher degree stenosis distal LYLA \par 3. Antegrade fllow in the vertebral arteries bilaterally \par \par ECHO 2/2021:\par 1. Normal LV size, systolic and diastolic function. EF 60-65%\par 2. Normal RV/LA/RA \par 3. Trivial AI\par 4. Mild MR\par 5. Mild TR, RVSP 25mmHg\par \par PHARM NUCLEAR STRESS TEST 1/2021:\par 1. Negative for ischemia/infarct\par 2. EF 72%\par 3. BP hypertensive baseline, normal response

## 2022-07-14 ENCOUNTER — APPOINTMENT (OUTPATIENT)
Dept: PULMONOLOGY | Facility: CLINIC | Age: 62
End: 2022-07-14

## 2022-07-14 VITALS
HEIGHT: 58 IN | RESPIRATION RATE: 16 BRPM | HEART RATE: 81 BPM | BODY MASS INDEX: 28.55 KG/M2 | DIASTOLIC BLOOD PRESSURE: 62 MMHG | WEIGHT: 136 LBS | SYSTOLIC BLOOD PRESSURE: 118 MMHG | OXYGEN SATURATION: 97 %

## 2022-07-14 PROCEDURE — 99214 OFFICE O/P EST MOD 30 MIN: CPT

## 2022-07-14 NOTE — HISTORY OF PRESENT ILLNESS
[Never] : never [Former] : former [Mild Persistent] : mild persistent [Doing Well] : doing well [Goals--Doing Well] : the patient is doing well with ~his/her~ asthma goals [Adherent] : the patient is adherent with ~his/her~ medication regimen [Follow-Up - Routine Clinic] : a routine clinic follow-up of [Excess Weight] : excess weight [Dyspnea] : dyspnea [Low Calorie Diet] : low calorie diet [Fair Compliance] : fair compliance with treatment [Fair Tolerance] : fair tolerance of treatment [Poor Symptom Control] : poor symptom control [Dyslipidemia] : dyslipidemia [High] : high [Low Calorie] : low calorie [Well Balanced Diet] : well balanced meals [Excessive Daytime Sleepiness] : excessive daytime sleepiness [Unrefreshing Sleep] : unrefreshing sleep [Sleepy When Sedentary] : sleepy when sedentary [Currently Experiencing] : The patient is currently experiencing symptoms. [None] : The patient is not currently being treated for this problem [TextBox_4] : Patient c/o SOBOE but is otherwise without associated respiratory complaints.She now p/w increased SOB and mild cough though without fevers or other respiratory complaints. She reports increased wheeze at times which resolves with Albuterol use but recently she has been c/o SOBOE with no improvement with Albuterol inhaler/nebulizer or with prednisone. She reports she has been quite sedentary which is likely contributing to her SOBOE.\par She was seen by cardiology with a reported negative w/u.\par Pt reports URI symptoms in later Dec 2021 but Covid swab was negative. She had persistent URI symptoms so was found to have Covid infection in early Jan 2022 when she felt worse with body aches, fatigue, h/a, nausea, cough, no fevers but with chills, loss of taste and smell. She is now essentially back to normal but with residual fatigue. She was treated with Z-riley and prednisone taper.\par She continues to c/o SOBOE and reports negative cardiac w/u.\par  [TextBox_11] : .1 [TextBox_13] : 5 [YearQuit] : 1990 [TextBox_27] : remote hx

## 2022-07-14 NOTE — CONSULT LETTER
[Dear  ___] : Dear  [unfilled], [Consult Letter:] : I had the pleasure of evaluating your patient, [unfilled]. [Please see my note below.] : Please see my note below. [Consult Closing:] : Thank you very much for allowing me to participate in the care of this patient.  If you have any questions, please do not hesitate to contact me. [Sincerely,] : Sincerely, [FreeTextEntry3] : Missael Matias MD, FCCP, D. ABSM\par Pulmonary and Sleep Medicine\par [facility] at [place]

## 2022-07-14 NOTE — DISCUSSION/SUMMARY
[FreeTextEntry1] : \par #1. Consider ENT evaluation for PNDS and sinus complaints if symptoms persist\par #2. Continue cough medicine as needed\par #3. Continue Advair 250/50 and Montelukast for mild asthma given reduction in spirometry and significant BD response in the past; last PFTs were essentially normal; will continue to monitor\par #4. ProAir as needed, especially prior to exercise given possible exercise induced asthma\par #5. Prednisone as needed though did not have relief with it in the past\par #6. Nasonex/Flonase for PNDS as needed\par #7. Diet and exercise for weight loss\par #8. CXR to further evaluate SOBOE was clear\par #9. Pt s/p J&J Covid vaccine and booster; s/p Covid infection\par #10. Cardiology f/u for SOBOE though may be related to weight and deconditioning given reported negative w/u; consider repeat chest CT if SOBOE persists without etiology\par #11. Prior CTA from 2/17/21 was unremarkable\par #12. Home PSG to evaluate for possible ANGEL though reports improved symptoms with weight loss so would like to hold off for now\par #13. PFTs and CXR given recent Covid infection from early 1/2022 were essentially normal\par #14. Pt appears to have recovered from her Covid infection from early 1/2022\par #15. F/u 2-3 months with PFTs to reassess lung function as a cause for her SOBOE\par #16. Reviewed risks of exposure and symptoms of Covid-19 virus, including how the virus is spread and precautions to avoid beltran virus.\par \par The patient expressed understanding and agreement with the above recommendations/plan and accepts responsibility to be compliant with recommended testing, therapies, and f/u visits.\par All relevant questions and concerns were addressed.

## 2022-10-18 ENCOUNTER — APPOINTMENT (OUTPATIENT)
Dept: CARDIOLOGY | Facility: CLINIC | Age: 62
End: 2022-10-18

## 2022-10-18 PROCEDURE — 93880 EXTRACRANIAL BILAT STUDY: CPT

## 2022-11-01 ENCOUNTER — APPOINTMENT (OUTPATIENT)
Dept: CARDIOLOGY | Facility: CLINIC | Age: 62
End: 2022-11-01

## 2022-11-01 ENCOUNTER — NON-APPOINTMENT (OUTPATIENT)
Age: 62
End: 2022-11-01

## 2022-11-01 VITALS
OXYGEN SATURATION: 95 % | HEART RATE: 97 BPM | RESPIRATION RATE: 16 BRPM | SYSTOLIC BLOOD PRESSURE: 150 MMHG | HEIGHT: 58 IN | DIASTOLIC BLOOD PRESSURE: 70 MMHG | WEIGHT: 140 LBS | BODY MASS INDEX: 29.39 KG/M2

## 2022-11-01 VITALS — SYSTOLIC BLOOD PRESSURE: 146 MMHG | DIASTOLIC BLOOD PRESSURE: 80 MMHG

## 2022-11-01 PROCEDURE — 99214 OFFICE O/P EST MOD 30 MIN: CPT | Mod: 25

## 2022-11-01 PROCEDURE — 93000 ELECTROCARDIOGRAM COMPLETE: CPT

## 2022-11-01 RX ORDER — METOPROLOL TARTRATE 25 MG/1
25 TABLET, FILM COATED ORAL
Qty: 4 | Refills: 0 | Status: DISCONTINUED | COMMUNITY
Start: 2022-04-28 | End: 2022-11-01

## 2022-11-01 NOTE — DISCUSSION/SUMMARY
[FreeTextEntry1] : Patient is a 63yo F former smoker, family h/o CAD, obesity, asthma here for cardiac follow up \par -Nuclear stress testing 2/2021 without ischemia, CTA with  without obstructive disease\par -Echo 5/2022 with preserved BiV function, no significant valve disease \par -Stable carotid stenosis, 50-69% LICA last checked 10/2022\par -BP much higher today than in past , but normal at recent PMD visit. \par \par \par \par 1. Continue medical management of carotid stenosis, on ASA statin. \par 2. Recommend aggressive diet and lifestyle modifications, counselled on weight loss\par 3. Will have patient come in for BP check in a couple weeks, if remains high will increase ARB, otherwise follow up 6 months\par 4. Still with dyspnea, planned for PFTs , states has to reschedule\par

## 2022-11-01 NOTE — ASSESSMENT
[FreeTextEntry1] : ECG: SR, NSST, RSR'\par \par  HDL 60 LDL 81  (4/2021) \par  HDL 78   (2/2020) \par BNP 10 (4/2021) \par CMP unremarkable (4/2021) \par TSH 4.55 (4/2021) \par \par CARDIAC CTA 5/2022:\par 1.  \par 2. LM 29, LAD 65 LCX 13 RCA 3\par 3. < 25% stenosis LM \par \par ECHO 5/2022:\par 1. Normal LV size and function, EF 65-70%\par 2. Normal RV/LA/RA \par 3. No clinically significant valvular disease \par \par CAROTID 10/2022:\par 1. 50-69% LICA stenosis\par 2. 1-49% LYLA stenosis\par 3. Antegrade flow in the vertebral arteries bilaterally \par 4. Elevated velocities due to tortuosity on the right \par 5. No change from prior\par \par Carotid 3/2022:\par 1. 50-69% LICA stenosis\par 2. 1-49% LYLA stenosis\par 3. Antegrade flow in the vertebral arteries bilaterally \par 4. Elevated velocities due to tortuosity on the right \par \par CAROTID 7/2021:\par 1. Borderline 50% stenosis left proximal ICA\par 2. 1-49% LYLA stenosis\par 3. Distal right ICA tortuous, cannot exclude higher degree stenosis\par 4. Antegrade flow in the vertebral arteries bilaterally \par \par CAROTID 2/2021:\par 1. 1-49% ICA stenosis bilaterally\par 2. Tortuous vessel, cannot exclude higher degree stenosis distal LYLA \par 3. Antegrade fllow in the vertebral arteries bilaterally \par \par ECHO 2/2021:\par 1. Normal LV size, systolic and diastolic function. EF 60-65%\par 2. Normal RV/LA/RA \par 3. Trivial AI\par 4. Mild MR\par 5. Mild TR, RVSP 25mmHg\par \par PHARM NUCLEAR STRESS TEST 1/2021:\par 1. Negative for ischemia/infarct\par 2. EF 72%\par 3. BP hypertensive baseline, normal response

## 2022-11-01 NOTE — HISTORY OF PRESENT ILLNESS
[FreeTextEntry1] : Patient is a 61yo F former smoker, strong family h/o CAD, obesity, carotid stenosis, asthma here for cardiac follow up. Follows with pulmonary regularly as well. found to have moderate carotid stenosis and on ASA/statin now. Patient denies PND/orthopnea/edema/palpitations/syncope/claudication. Statin increased last OV. Was still having dyspnea then as well wihgch is essentially unchanged. Had side effects after influenza vaccine now resolved. 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

## 2022-11-08 ENCOUNTER — APPOINTMENT (OUTPATIENT)
Dept: CARDIOLOGY | Facility: CLINIC | Age: 62
End: 2022-11-08

## 2022-11-08 ENCOUNTER — APPOINTMENT (OUTPATIENT)
Dept: PULMONOLOGY | Facility: CLINIC | Age: 62
End: 2022-11-08

## 2022-11-17 ENCOUNTER — APPOINTMENT (OUTPATIENT)
Dept: CARDIOLOGY | Facility: CLINIC | Age: 62
End: 2022-11-17

## 2022-11-17 VITALS
WEIGHT: 140 LBS | OXYGEN SATURATION: 98 % | SYSTOLIC BLOOD PRESSURE: 112 MMHG | RESPIRATION RATE: 16 BRPM | HEIGHT: 58 IN | DIASTOLIC BLOOD PRESSURE: 70 MMHG | BODY MASS INDEX: 29.39 KG/M2 | HEART RATE: 93 BPM

## 2022-11-17 DIAGNOSIS — R42 DIZZINESS AND GIDDINESS: ICD-10-CM

## 2022-11-17 PROCEDURE — 99214 OFFICE O/P EST MOD 30 MIN: CPT

## 2022-11-17 RX ORDER — ROSUVASTATIN CALCIUM 10 MG/1
10 TABLET, FILM COATED ORAL
Qty: 90 | Refills: 0 | Status: DISCONTINUED | COMMUNITY
Start: 2022-05-02 | End: 2022-11-17

## 2022-11-17 NOTE — DISCUSSION/SUMMARY
[FreeTextEntry1] : Patient is a 61yo F former smoker, family h/o CAD, obesity, asthma here for cardiac follow up \par -Nuclear stress testing 2/2021 without ischemia, CTA with  without obstructive disease\par -Echo 5/2022 with preserved BiV function, no significant valve disease \par -Stable carotid stenosis, 50-69% LICA last checked 10/2022\par -BP much better today. \par \par \par \par 1. Continue medical management of carotid stenosis, on ASA statin. \par 2. Recommend aggressive diet and lifestyle modifications, counselled on weight loss\par 3. Continue current antihypertensives, blood pressure is well controlled \par 4. Still with dyspnea, planned for PFTs , states has to reschedule\par 5. Follow up 6 months\par 6. Mild positional dizziness, unlikely cardiac. Continue to monitor, follow up with PMD

## 2022-11-17 NOTE — ASSESSMENT
[FreeTextEntry1] : \par \par  HDL 55   (10/2022) \par  HDL 60 LDL 81  (4/2021) \par  HDL 78   (2/2020) \par BNP 10 (4/2021) \par CMP unremarkable (4/2021) \par TSH 4.55 (4/2021) \par \par CARDIAC CTA 5/2022:\par 1.  \par 2. LM 29, LAD 65 LCX 13 RCA 3\par 3. < 25% stenosis LM \par \par ECHO 5/2022:\par 1. Normal LV size and function, EF 65-70%\par 2. Normal RV/LA/RA \par 3. No clinically significant valvular disease \par \par CAROTID 10/2022:\par 1. 50-69% LICA stenosis\par 2. 1-49% LYLA stenosis\par 3. Antegrade flow in the vertebral arteries bilaterally \par 4. Elevated velocities due to tortuosity on the right \par 5. No change from prior\par \par Carotid 3/2022:\par 1. 50-69% LICA stenosis\par 2. 1-49% LYLA stenosis\par 3. Antegrade flow in the vertebral arteries bilaterally \par 4. Elevated velocities due to tortuosity on the right \par \par CAROTID 7/2021:\par 1. Borderline 50% stenosis left proximal ICA\par 2. 1-49% LYLA stenosis\par 3. Distal right ICA tortuous, cannot exclude higher degree stenosis\par 4. Antegrade flow in the vertebral arteries bilaterally \par \par CAROTID 2/2021:\par 1. 1-49% ICA stenosis bilaterally\par 2. Tortuous vessel, cannot exclude higher degree stenosis distal LYLA \par 3. Antegrade fllow in the vertebral arteries bilaterally \par \par ECHO 2/2021:\par 1. Normal LV size, systolic and diastolic function. EF 60-65%\par 2. Normal RV/LA/RA \par 3. Trivial AI\par 4. Mild MR\par 5. Mild TR, RVSP 25mmHg\par \par PHARM NUCLEAR STRESS TEST 1/2021:\par 1. Negative for ischemia/infarct\par 2. EF 72%\par 3. BP hypertensive baseline, normal response

## 2022-11-17 NOTE — HISTORY OF PRESENT ILLNESS
[FreeTextEntry1] : Patient is a 63yo F former smoker, strong family h/o CAD, obesity, carotid stenosis, asthma here for cardiac follow up. Follows with pulmonary regularly as well. found to have moderate carotid stenosis and on ASA/statin now. \par \par Patient denies PND/orthopnea/edema/palpitations/syncope/claudication.Mild lightheadedness when turns quickly, noticed past couple weeks. Happens occasionally, not daily. \par \par Rosuvastatin increased to 40mg qhs last after last OV. 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

## 2023-04-25 ENCOUNTER — NON-APPOINTMENT (OUTPATIENT)
Age: 63
End: 2023-04-25

## 2023-04-25 ENCOUNTER — APPOINTMENT (OUTPATIENT)
Dept: CARDIOLOGY | Facility: CLINIC | Age: 63
End: 2023-04-25
Payer: COMMERCIAL

## 2023-04-25 VITALS
HEART RATE: 96 BPM | OXYGEN SATURATION: 96 % | WEIGHT: 141 LBS | HEIGHT: 58 IN | BODY MASS INDEX: 29.6 KG/M2 | RESPIRATION RATE: 16 BRPM | SYSTOLIC BLOOD PRESSURE: 112 MMHG | DIASTOLIC BLOOD PRESSURE: 70 MMHG

## 2023-04-25 DIAGNOSIS — I34.0 NONRHEUMATIC MITRAL (VALVE) INSUFFICIENCY: ICD-10-CM

## 2023-04-25 PROCEDURE — 93000 ELECTROCARDIOGRAM COMPLETE: CPT

## 2023-04-25 PROCEDURE — 99214 OFFICE O/P EST MOD 30 MIN: CPT | Mod: 25

## 2023-04-25 RX ORDER — ESCITALOPRAM OXALATE 10 MG/1
10 TABLET ORAL DAILY
Refills: 0 | Status: ACTIVE | COMMUNITY

## 2023-04-25 NOTE — HISTORY OF PRESENT ILLNESS
[FreeTextEntry1] : Patient is a 61yo F former smoker, strong family h/o CAD, obesity, carotid stenosis, asthma here for cardiac follow up. Follows with pulmonary regularly as well. found to have moderate carotid stenosis and on ASA/statin now. \par \par Patient denies PND/orthopnea/edema/palpitations/syncope/claudication.Mild lightheadedness noted last OV now better.  some allergies now with itchy eyes. 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

## 2023-04-25 NOTE — ASSESSMENT
[FreeTextEntry1] : ECG: SR, no significant ST-T abnormalities and normal intervals \par \par \par  HDL 55   (10/2022) \par  HDL 60 LDL 81  (4/2021) \par  HDL 78   (2/2020) \par BNP 10 (4/2021) \par CMP unremarkable (4/2021) \par TSH 4.55 (4/2021) \par \par CARDIAC CTA 5/2022:\par 1.  \par 2. LM 29, LAD 65 LCX 13 RCA 3\par 3. < 25% stenosis LM \par \par ECHO 5/2022:\par 1. Normal LV size and function, EF 65-70%\par 2. Normal RV/LA/RA \par 3. No clinically significant valvular disease \par \par CAROTID 10/2022:\par 1. 50-69% LICA stenosis\par 2. 1-49% LYLA stenosis\par 3. Antegrade flow in the vertebral arteries bilaterally \par 4. Elevated velocities due to tortuosity on the right \par 5. No change from prior\par \par Carotid 3/2022:\par 1. 50-69% LICA stenosis\par 2. 1-49% LYLA stenosis\par 3. Antegrade flow in the vertebral arteries bilaterally \par 4. Elevated velocities due to tortuosity on the right \par \par CAROTID 7/2021:\par 1. Borderline 50% stenosis left proximal ICA\par 2. 1-49% LYLA stenosis\par 3. Distal right ICA tortuous, cannot exclude higher degree stenosis\par 4. Antegrade flow in the vertebral arteries bilaterally \par \par CAROTID 2/2021:\par 1. 1-49% ICA stenosis bilaterally\par 2. Tortuous vessel, cannot exclude higher degree stenosis distal LYLA \par 3. Antegrade fllow in the vertebral arteries bilaterally \par \par ECHO 2/2021:\par 1. Normal LV size, systolic and diastolic function. EF 60-65%\par 2. Normal RV/LA/RA \par 3. Trivial AI\par 4. Mild MR\par 5. Mild TR, RVSP 25mmHg\par \par PHARM NUCLEAR STRESS TEST 1/2021:\par 1. Negative for ischemia/infarct\par 2. EF 72%\par 3. BP hypertensive baseline, normal response

## 2023-04-25 NOTE — DISCUSSION/SUMMARY
[FreeTextEntry1] : Patient is a 63yo F former smoker, family h/o CAD, obesity, asthma here for cardiac follow up \par -Nuclear stress testing 2/2021 without ischemia, CTA with  without obstructive disease\par -Echo 5/2022 with preserved BiV function, no significant valve disease \par -Stable carotid stenosis, 50-69% LICA last checked 10/2022\par -BP much better today. \par -Lipids high in the fall but was off statin, will have lipids rechecked at PMD Dr Rao \par \par \par 1. Continue medical management of carotid stenosis, on ASA / statin (rosuvastatin 40mg qhs). \par 2. Recommend aggressive diet and lifestyle modifications, counselled on weight loss\par 3. Continue current antihypertensives, blood pressure is well controlled \par 4. Needs regular pulm follow up, was planned for PFTs , states has to reschedule\par 5. Follow up 6 months, carotid then for surveillance\par  [EKG obtained to assist in diagnosis and management of assessed problem(s)] : EKG obtained to assist in diagnosis and management of assessed problem(s)

## 2023-07-10 ENCOUNTER — NON-APPOINTMENT (OUTPATIENT)
Age: 63
End: 2023-07-10

## 2023-07-11 ENCOUNTER — APPOINTMENT (OUTPATIENT)
Dept: PULMONOLOGY | Facility: CLINIC | Age: 63
End: 2023-07-11
Payer: COMMERCIAL

## 2023-07-11 VITALS
BODY MASS INDEX: 28.76 KG/M2 | SYSTOLIC BLOOD PRESSURE: 121 MMHG | RESPIRATION RATE: 16 BRPM | WEIGHT: 137 LBS | HEIGHT: 58 IN | HEART RATE: 89 BPM | OXYGEN SATURATION: 97 % | DIASTOLIC BLOOD PRESSURE: 67 MMHG

## 2023-07-11 DIAGNOSIS — R07.89 OTHER CHEST PAIN: ICD-10-CM

## 2023-07-11 DIAGNOSIS — E66.3 OVERWEIGHT: ICD-10-CM

## 2023-07-11 PROCEDURE — 99214 OFFICE O/P EST MOD 30 MIN: CPT

## 2023-07-11 RX ORDER — MOMETASONE 50 UG/1
50 SPRAY, METERED NASAL
Qty: 3 | Refills: 0 | Status: ACTIVE | COMMUNITY
Start: 2018-04-02 | End: 1900-01-01

## 2023-07-11 NOTE — DISCUSSION/SUMMARY
[FreeTextEntry1] : \par #1. Consider ENT evaluation for PNDS and sinus complaints if symptoms persist\par #2. Continue cough medicine as needed\par #3. Continue Advair 250/50 and Montelukast for mild asthma given reduction in spirometry and significant BD response in the past; last PFTs were essentially normal; will continue to monitor\par #4. ProAir as needed, especially prior to exercise given possible exercise induced asthma\par #5. Prednisone as needed though did not have relief with it in the past\par #6. Nasonex/Flonase for PNDS as needed\par #7. Diet and exercise for weight loss\par #8. CXR to further evaluate SOBOE was clear\par #9. Pt s/p J&J Covid vaccine and booster; s/p Covid infection\par #10. Cardiology f/u for SOBOE though may be related to weight and deconditioning given reported negative w/u; consider repeat chest CT if SOBOE persists without etiology\par #11. Prior CTA from 2/17/21 was unremarkable\par #12. Home PSG to evaluate for possible ANGEL though reports improved symptoms with weight loss so would like to hold off for now\par #13. PFTs and CXR given recent Covid infection from early 1/2022 were essentially normal\par #14. Pt appears to have recovered from her Covid infection from early 1/2022\par #15. F/u 3 months with PFTs to reassess lung function as a cause for her SOBOE\par \par The patient expressed understanding and agreement with the above recommendations/plan and accepts responsibility to be compliant with recommended testing, therapies, and f/u visits.\par All relevant questions and concerns were addressed.

## 2023-07-11 NOTE — HISTORY OF PRESENT ILLNESS
[Mild Persistent] : mild persistent [Doing Well] : doing well [Adherent] : the patient is adherent with ~his/her~ medication regimen [Goals--Doing Well] : the patient is doing well with ~his/her~ asthma goals [Follow-Up - Routine Clinic] : a routine clinic follow-up of [Excess Weight] : excess weight [Dyspnea] : dyspnea [Low Calorie Diet] : low calorie diet [Fair Compliance] : fair compliance with treatment [Fair Tolerance] : fair tolerance of treatment [Poor Symptom Control] : poor symptom control [Dyslipidemia] : dyslipidemia [High] : high [Low Calorie] : low calorie [Well Balanced Diet] : well balanced meals [Excessive Daytime Sleepiness] : excessive daytime sleepiness [Unrefreshing Sleep] : unrefreshing sleep [Sleepy When Sedentary] : sleepy when sedentary [Currently Experiencing] : The patient is currently experiencing symptoms. [None] : The patient is not currently being treated for this problem [TextBox_4] : Patient c/o SOBOE but is otherwise without associated respiratory complaints.She now p/w increased SOB and mild cough though without fevers or other respiratory complaints. She reports increased wheeze at times which resolves with Albuterol use but recently she has been c/o SOBOE with no improvement with Albuterol inhaler/nebulizer or with prednisone. She reports she has been quite sedentary which is likely contributing to her SOBOE.\par She was seen by cardiology with a reported negative w/u.\par Pt reports URI symptoms in later Dec 2021 but Covid swab was negative. She had persistent URI symptoms so was found to have Covid infection in early Jan 2022 when she felt worse with body aches, fatigue, h/a, nausea, cough, no fevers but with chills, loss of taste and smell. She is now essentially back to normal but with residual fatigue. She was treated with Z-riley and prednisone taper.\par She continues to c/o SOBOE and reports negative cardiac w/u.\par

## 2023-07-11 NOTE — CONSULT LETTER
[Dear  ___] : Dear  [unfilled], [Consult Letter:] : I had the pleasure of evaluating your patient, [unfilled]. [Please see my note below.] : Please see my note below. [Consult Closing:] : Thank you very much for allowing me to participate in the care of this patient.  If you have any questions, please do not hesitate to contact me. [Sincerely,] : Sincerely, [FreeTextEntry3] : Missael Matias MD, FCCP, D. ABSM\par Pulmonary and Sleep Medicine\par [facility] [place]

## 2023-09-28 ENCOUNTER — APPOINTMENT (OUTPATIENT)
Dept: PULMONOLOGY | Facility: CLINIC | Age: 63
End: 2023-09-28
Payer: COMMERCIAL

## 2023-09-28 VITALS — WEIGHT: 142 LBS | HEIGHT: 57.5 IN | BODY MASS INDEX: 30.22 KG/M2

## 2023-09-28 VITALS
SYSTOLIC BLOOD PRESSURE: 130 MMHG | RESPIRATION RATE: 16 BRPM | DIASTOLIC BLOOD PRESSURE: 80 MMHG | OXYGEN SATURATION: 96 % | HEART RATE: 81 BPM

## 2023-09-28 DIAGNOSIS — Z85.820 PERSONAL HISTORY OF MALIGNANT MELANOMA OF SKIN: ICD-10-CM

## 2023-09-28 DIAGNOSIS — R05.9 COUGH, UNSPECIFIED: ICD-10-CM

## 2023-09-28 DIAGNOSIS — Z86.16 PERSONAL HISTORY OF COVID-19: ICD-10-CM

## 2023-09-28 DIAGNOSIS — G47.33 OBSTRUCTIVE SLEEP APNEA (ADULT) (PEDIATRIC): ICD-10-CM

## 2023-09-28 DIAGNOSIS — Z01.811 ENCOUNTER FOR PREPROCEDURAL RESPIRATORY EXAMINATION: ICD-10-CM

## 2023-09-28 DIAGNOSIS — J45.909 UNSPECIFIED ASTHMA, UNCOMPLICATED: ICD-10-CM

## 2023-09-28 DIAGNOSIS — R09.82 POSTNASAL DRIP: ICD-10-CM

## 2023-09-28 DIAGNOSIS — R06.02 SHORTNESS OF BREATH: ICD-10-CM

## 2023-09-28 PROCEDURE — 94729 DIFFUSING CAPACITY: CPT

## 2023-09-28 PROCEDURE — 99214 OFFICE O/P EST MOD 30 MIN: CPT | Mod: 25

## 2023-09-28 PROCEDURE — 94727 GAS DIL/WSHOT DETER LNG VOL: CPT

## 2023-09-28 PROCEDURE — 85018 HEMOGLOBIN: CPT | Mod: QW

## 2023-09-28 PROCEDURE — 94010 BREATHING CAPACITY TEST: CPT

## 2023-09-28 RX ORDER — FLUTICASONE PROPIONATE AND SALMETEROL 250; 50 UG/1; UG/1
250-50 POWDER RESPIRATORY (INHALATION)
Qty: 3 | Refills: 1 | Status: ACTIVE | COMMUNITY
Start: 2020-12-02 | End: 1900-01-01

## 2023-09-28 RX ORDER — PREDNISONE 10 MG/1
10 TABLET ORAL
Qty: 50 | Refills: 0 | Status: ACTIVE | COMMUNITY
Start: 2023-09-28 | End: 1900-01-01

## 2023-10-19 ENCOUNTER — APPOINTMENT (OUTPATIENT)
Dept: CARDIOLOGY | Facility: CLINIC | Age: 63
End: 2023-10-19
Payer: COMMERCIAL

## 2023-10-19 PROCEDURE — 93880 EXTRACRANIAL BILAT STUDY: CPT

## 2023-12-06 ENCOUNTER — APPOINTMENT (OUTPATIENT)
Dept: CARDIOLOGY | Facility: CLINIC | Age: 63
End: 2023-12-06
Payer: COMMERCIAL

## 2023-12-06 ENCOUNTER — NON-APPOINTMENT (OUTPATIENT)
Age: 63
End: 2023-12-06

## 2023-12-06 VITALS
RESPIRATION RATE: 16 BRPM | SYSTOLIC BLOOD PRESSURE: 132 MMHG | DIASTOLIC BLOOD PRESSURE: 90 MMHG | HEART RATE: 75 BPM | HEIGHT: 57.5 IN | WEIGHT: 140 LBS | BODY MASS INDEX: 29.79 KG/M2 | OXYGEN SATURATION: 95 %

## 2023-12-06 DIAGNOSIS — R93.1 ABNORMAL FINDINGS ON DIAGNOSTIC IMAGING OF HEART AND CORONARY CIRCULATION: ICD-10-CM

## 2023-12-06 DIAGNOSIS — I10 ESSENTIAL (PRIMARY) HYPERTENSION: ICD-10-CM

## 2023-12-06 DIAGNOSIS — Z01.810 ENCOUNTER FOR PREPROCEDURAL CARDIOVASCULAR EXAMINATION: ICD-10-CM

## 2023-12-06 DIAGNOSIS — I65.29 OCCLUSION AND STENOSIS OF UNSPECIFIED CAROTID ARTERY: ICD-10-CM

## 2023-12-06 DIAGNOSIS — E78.5 HYPERLIPIDEMIA, UNSPECIFIED: ICD-10-CM

## 2023-12-06 DIAGNOSIS — E66.9 OBESITY, UNSPECIFIED: ICD-10-CM

## 2023-12-06 PROCEDURE — 99214 OFFICE O/P EST MOD 30 MIN: CPT | Mod: 25

## 2023-12-06 PROCEDURE — 93000 ELECTROCARDIOGRAM COMPLETE: CPT

## 2023-12-06 RX ORDER — VALSARTAN 80 MG/1
80 TABLET, COATED ORAL DAILY
Qty: 90 | Refills: 3 | Status: ACTIVE | COMMUNITY
Start: 2021-02-11 | End: 1900-01-01

## 2023-12-12 ENCOUNTER — APPOINTMENT (OUTPATIENT)
Dept: PULMONOLOGY | Facility: CLINIC | Age: 63
End: 2023-12-12

## 2024-04-12 ENCOUNTER — OFFICE (OUTPATIENT)
Dept: URBAN - METROPOLITAN AREA CLINIC 94 | Facility: CLINIC | Age: 64
Setting detail: OPHTHALMOLOGY
End: 2024-04-12
Payer: COMMERCIAL

## 2024-04-12 DIAGNOSIS — H16.223: ICD-10-CM

## 2024-04-12 DIAGNOSIS — H16.222: ICD-10-CM

## 2024-04-12 DIAGNOSIS — H16.221: ICD-10-CM

## 2024-04-12 DIAGNOSIS — H04.553: ICD-10-CM

## 2024-04-12 PROCEDURE — 92012 INTRM OPH EXAM EST PATIENT: CPT | Mod: 25 | Performed by: OPHTHALMOLOGY

## 2024-04-12 PROCEDURE — 83861 MICROFLUID ANALY TEARS: CPT | Mod: LT | Performed by: OPHTHALMOLOGY

## 2024-04-12 PROCEDURE — 83861 MICROFLUID ANALY TEARS: CPT | Mod: RT | Performed by: OPHTHALMOLOGY

## 2024-04-12 PROCEDURE — 68840 EXPLORE/IRRIGATE TEAR DUCTS: CPT | Mod: 50 | Performed by: OPHTHALMOLOGY

## 2024-04-12 ASSESSMENT — LID POSITION - DERMATOCHALASIS
OS_DERMATOCHALASIS: LUL 3+ 4+
OD_DERMATOCHALASIS: RUL 3+ 4+

## 2024-04-12 ASSESSMENT — LID EXAM ASSESSMENTS
OS_BLEPHARITIS: LUL T 1+
OD_BLEPHARITIS: RUL T 1+

## 2024-04-16 ENCOUNTER — OFFICE (OUTPATIENT)
Dept: URBAN - METROPOLITAN AREA CLINIC 94 | Facility: CLINIC | Age: 64
Setting detail: OPHTHALMOLOGY
End: 2024-04-16
Payer: COMMERCIAL

## 2024-04-16 DIAGNOSIS — H43.813: ICD-10-CM

## 2024-04-16 PROBLEM — H04.552 NASOLACRIMAL DUCT OBSTRUCTION ACQUIRED; RIGHT EYE, LEFT EYE: Status: ACTIVE | Noted: 2024-04-12

## 2024-04-16 PROBLEM — H43.391 VITREOUS FLOATERS; RIGHT EYE: Status: ACTIVE | Noted: 2024-04-16

## 2024-04-16 PROBLEM — H04.551 NASOLACRIMAL DUCT OBSTRUCTION ACQUIRED; RIGHT EYE, LEFT EYE: Status: ACTIVE | Noted: 2024-04-12

## 2024-04-16 PROCEDURE — 92012 INTRM OPH EXAM EST PATIENT: CPT | Mod: 24 | Performed by: OPHTHALMOLOGY

## 2024-04-16 PROCEDURE — 92134 CPTRZ OPH DX IMG PST SGM RTA: CPT | Performed by: OPHTHALMOLOGY

## 2024-04-16 ASSESSMENT — LID EXAM ASSESSMENTS
OD_BLEPHARITIS: RUL T 1+
OS_BLEPHARITIS: LUL T 1+

## 2024-04-16 ASSESSMENT — LID POSITION - DERMATOCHALASIS
OS_DERMATOCHALASIS: LUL 3+ 4+
OD_DERMATOCHALASIS: RUL 3+ 4+

## 2024-05-24 ENCOUNTER — RX ONLY (RX ONLY)
Age: 64
End: 2024-05-24

## 2024-05-24 ENCOUNTER — OFFICE (OUTPATIENT)
Dept: URBAN - METROPOLITAN AREA CLINIC 94 | Facility: CLINIC | Age: 64
Setting detail: OPHTHALMOLOGY
End: 2024-05-24
Payer: COMMERCIAL

## 2024-05-24 DIAGNOSIS — H04.553: ICD-10-CM

## 2024-05-24 DIAGNOSIS — H16.223: ICD-10-CM

## 2024-05-24 PROBLEM — H04.551 NASOLACRIMAL DUCT OBSTRUCTION ACQUIRED; RIGHT EYE, LEFT EYE, BOTH EYES: Status: ACTIVE | Noted: 2024-05-24

## 2024-05-24 PROBLEM — H04.552 NASOLACRIMAL DUCT OBSTRUCTION ACQUIRED; RIGHT EYE, LEFT EYE, BOTH EYES: Status: ACTIVE | Noted: 2024-05-24

## 2024-05-24 PROCEDURE — 99213 OFFICE O/P EST LOW 20 MIN: CPT | Mod: 25 | Performed by: OPHTHALMOLOGY

## 2024-05-24 PROCEDURE — 68840 EXPLORE/IRRIGATE TEAR DUCTS: CPT | Mod: 50 | Performed by: OPHTHALMOLOGY

## 2024-05-24 ASSESSMENT — LID EXAM ASSESSMENTS
OS_BLEPHARITIS: LUL T 1+
OD_BLEPHARITIS: RUL T 1+

## 2024-05-24 ASSESSMENT — CONFRONTATIONAL VISUAL FIELD TEST (CVF)
OD_FINDINGS: FULL
OS_FINDINGS: FULL

## 2024-05-24 ASSESSMENT — LID POSITION - DERMATOCHALASIS
OS_DERMATOCHALASIS: LUL 3+ 4+
OD_DERMATOCHALASIS: RUL 3+ 4+

## 2024-06-10 ENCOUNTER — APPOINTMENT (OUTPATIENT)
Dept: CARDIOLOGY | Facility: CLINIC | Age: 64
End: 2024-06-10
Payer: COMMERCIAL

## 2024-06-10 VITALS
WEIGHT: 137 LBS | BODY MASS INDEX: 29.15 KG/M2 | OXYGEN SATURATION: 95 % | HEIGHT: 57.5 IN | RESPIRATION RATE: 16 BRPM | HEART RATE: 81 BPM | DIASTOLIC BLOOD PRESSURE: 86 MMHG | SYSTOLIC BLOOD PRESSURE: 140 MMHG

## 2024-06-10 DIAGNOSIS — Z87.891 PERSONAL HISTORY OF NICOTINE DEPENDENCE: ICD-10-CM

## 2024-06-10 PROCEDURE — 93000 ELECTROCARDIOGRAM COMPLETE: CPT

## 2024-06-10 PROCEDURE — G2211 COMPLEX E/M VISIT ADD ON: CPT | Mod: NC,1L

## 2024-06-10 PROCEDURE — 99214 OFFICE O/P EST MOD 30 MIN: CPT

## 2024-06-10 RX ORDER — EZETIMIBE 10 MG/1
10 TABLET ORAL
Qty: 90 | Refills: 3 | Status: ACTIVE | COMMUNITY
Start: 2024-06-10 | End: 1900-01-01

## 2024-06-10 NOTE — DISCUSSION/SUMMARY
[EKG obtained to assist in diagnosis and management of assessed problem(s)] : EKG obtained to assist in diagnosis and management of assessed problem(s) [FreeTextEntry1] : Patient is a 64yo F former smoker, family h/o CAD, obesity, asthma here for cardiac follow up   CAC: -CAC score 108 from 2022 and nonobstructive on CTA, negative nuclear stress in 2021 -Echo 5/2022 with preserved BiV function, no significant valve disease   CAROTID STENOSIS: -Stable carotid stenosis, improved on duplex 100568. Med management  HTN: -BP has been high, remains on Valsartan and will increase to 160mg daily  HLD -ON high dose statin but lipids above goal, will add ezetimibe   1. Continue medical management of carotid stenosis, on ASA / statin (rosuvastatin 40mg qhs). Will add zetia 10mg daily and obtain recent BW to review 2. Patient is at low cardiovascular risk for surgery, may hold ASA 7 days prior and restart post-operatively as soon as acceptable bleeding risk  3. Increase Valsartan to 160mg daily 4. Regular pulm follow up and PMD 5. Follow up 3 months to reevaluate BP/lipids and carotid stenosis with duplex

## 2024-06-10 NOTE — ASSESSMENT
[FreeTextEntry1] : ECG: SR, no significant ST-T abnormalities and normal intervals   HDL 74   (11/2023)   HDL 55   (10/2022)  HDL 60 LDL 81  (4/2021)  HDL 78   (2/2020) BNP 10 (4/2021) CMP unremarkable (4/2021) TSH 4.55 (4/2021)   CARDIAC CTA 5/2022: 1.  2. LM 29, LAD 65 LCX 13 RCA 3 3. < 25% stenosis LM  PHARM NUCLEAR STRESS TEST 1/2021: 1. Negative for ischemia/infarct 2. EF 72% 3. BP hypertensive baseline, normal response.  ECHO 5/2022: 1. Normal LV size and function, EF 65-70% 2. Normal RV/LA/RA 3. No clinically significant valvular disease  ECHO 2/2021: 1. Normal LV size, systolic and diastolic function. EF 60-65% 2. Normal RV/LA/RA 3. Trivial AI 4. Mild MR 5. Mild TR, RVSP 25mmHg  CAROTID 10/2023: 1. 20-49% LICA stenosis 2. 1-19% LYLA stenosis 3. Antegrade flow in the vertebral arteries bilaterally   CAROTID 10/2022: 1. 50-69% LICA stenosis 2. 1-49% LYLA stenosis 3. Antegrade flow in the vertebral arteries bilaterally 4. Elevated velocities due to tortuosity on the right 5. No change from prior  Carotid 3/2022: 1. 50-69% LICA stenosis 2. 1-49% LYLA stenosis 3. Antegrade flow in the vertebral arteries bilaterally 4. Elevated velocities due to tortuosity on the right  CAROTID 7/2021: 1. Borderline 50% stenosis left proximal ICA 2. 1-49% LYLA stenosis 3. Distal right ICA tortuous, cannot exclude higher degree stenosis 4. Antegrade flow in the vertebral arteries bilaterally  CAROTID 2/2021: 1. 1-49% ICA stenosis bilaterally 2. Tortuous vessel, cannot exclude higher degree stenosis distal LYLA 3. Antegrade fllow in the vertebral arteries bilaterally

## 2024-06-10 NOTE — HISTORY OF PRESENT ILLNESS
[FreeTextEntry1] : Patient is a 62yo F former smoker, strong family h/o CAD, obesity, carotid stenosis, asthma here for cardiac follow up. Had renal  mass removed and partial nephrectomy after last OV. States was cancerous but no need for chemo/radiation. Told excised and no spread.   Patient denies PND/orthopnea/edema/palpitations/syncope/claudication. No CP/SOB. No new complaints at this time. Able to go up flight of stairs without symptoms.  Lives with  and teenage kids. 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 Has son with cerebral palsy who had Achilles tendon surgery recently.   ROS: GI and  negative

## 2024-06-25 ENCOUNTER — OFFICE (OUTPATIENT)
Dept: URBAN - METROPOLITAN AREA CLINIC 112 | Facility: CLINIC | Age: 64
Setting detail: OPHTHALMOLOGY
End: 2024-06-25
Payer: COMMERCIAL

## 2024-06-25 DIAGNOSIS — H04.553: ICD-10-CM

## 2024-06-25 PROCEDURE — 92012 INTRM OPH EXAM EST PATIENT: CPT | Performed by: OPHTHALMOLOGY

## 2024-06-25 ASSESSMENT — LID EXAM ASSESSMENTS
OD_BLEPHARITIS: RUL T 1+
OS_BLEPHARITIS: LUL T 1+

## 2024-06-25 ASSESSMENT — LID POSITION - DERMATOCHALASIS
OS_DERMATOCHALASIS: LUL 3+ 4+
OD_DERMATOCHALASIS: RUL 3+ 4+

## 2024-08-20 ENCOUNTER — NON-APPOINTMENT (OUTPATIENT)
Age: 64
End: 2024-08-20

## 2024-09-05 ENCOUNTER — APPOINTMENT (OUTPATIENT)
Dept: CARDIOLOGY | Facility: CLINIC | Age: 64
End: 2024-09-05
Payer: COMMERCIAL

## 2024-09-05 PROCEDURE — 93880 EXTRACRANIAL BILAT STUDY: CPT

## 2024-09-10 ENCOUNTER — NON-APPOINTMENT (OUTPATIENT)
Age: 64
End: 2024-09-10

## 2024-09-10 ENCOUNTER — APPOINTMENT (OUTPATIENT)
Dept: CARDIOLOGY | Facility: CLINIC | Age: 64
End: 2024-09-10
Payer: COMMERCIAL

## 2024-09-10 VITALS
BODY MASS INDEX: 28.72 KG/M2 | HEART RATE: 87 BPM | HEIGHT: 57.5 IN | SYSTOLIC BLOOD PRESSURE: 130 MMHG | DIASTOLIC BLOOD PRESSURE: 64 MMHG | WEIGHT: 135 LBS

## 2024-09-10 DIAGNOSIS — I10 ESSENTIAL (PRIMARY) HYPERTENSION: ICD-10-CM

## 2024-09-10 DIAGNOSIS — G47.33 OBSTRUCTIVE SLEEP APNEA (ADULT) (PEDIATRIC): ICD-10-CM

## 2024-09-10 DIAGNOSIS — I65.29 OCCLUSION AND STENOSIS OF UNSPECIFIED CAROTID ARTERY: ICD-10-CM

## 2024-09-10 DIAGNOSIS — E78.5 HYPERLIPIDEMIA, UNSPECIFIED: ICD-10-CM

## 2024-09-10 DIAGNOSIS — Z82.49 FAMILY HISTORY OF ISCHEMIC HEART DISEASE AND OTHER DISEASES OF THE CIRCULATORY SYSTEM: ICD-10-CM

## 2024-09-10 DIAGNOSIS — I34.0 NONRHEUMATIC MITRAL (VALVE) INSUFFICIENCY: ICD-10-CM

## 2024-09-10 DIAGNOSIS — R93.1 ABNORMAL FINDINGS ON DIAGNOSTIC IMAGING OF HEART AND CORONARY CIRCULATION: ICD-10-CM

## 2024-09-10 DIAGNOSIS — Z87.891 PERSONAL HISTORY OF NICOTINE DEPENDENCE: ICD-10-CM

## 2024-09-10 PROCEDURE — 93000 ELECTROCARDIOGRAM COMPLETE: CPT

## 2024-09-10 PROCEDURE — 99214 OFFICE O/P EST MOD 30 MIN: CPT

## 2024-09-10 PROCEDURE — G2211 COMPLEX E/M VISIT ADD ON: CPT | Mod: NC

## 2024-09-10 NOTE — ASSESSMENT
[FreeTextEntry1] : ECG: SR, no significant ST-T abnormalities and normal intervals, RSR'   HDL 70 LDL 95  (8/2024)   HDL 74   (11/2023)   HDL 55   (10/2022)  HDL 60 LDL 81  (4/2021)  HDL 78   (2/2020) BNP 10 (4/2021) CMP unremarkable (4/2021) TSH 4.55 (4/2021)   CARDIAC CTA 5/2022: 1.  2. LM 29, LAD 65 LCX 13 RCA 3 3. < 25% stenosis LM  PHARM NUCLEAR STRESS TEST 1/2021: 1. Negative for ischemia/infarct 2. EF 72% 3. BP hypertensive baseline, normal response.  ECHO 5/2022: 1. Normal LV size and function, EF 65-70% 2. Normal RV/LA/RA 3. No clinically significant valvular disease  ECHO 2/2021: 1. Normal LV size, systolic and diastolic function. EF 60-65% 2. Normal RV/LA/RA 3. Trivial AI 4. Mild MR 5. Mild TR, RVSP 25mmHg  CAROTID 9/2024: 20-49% ICA stenosis bilaterally, Antegrade flow in the vertebral arteries bilaterally CAROTID 10/2023:20-49% LICA stenosis, 1-19% LYLA stenosis, Antegrade flow in the vertebral arteries bilaterally  CAROTID 10/2022:50-69% LICA stenosis. 1-49% LYLA stenosis. Antegrade flow in the vertebral arteries CAROTID: 3/2022:50-69% LICA stenosis. 1-49% LYLA stenosis. Antegrade flow in the vertebral arteries CAROTID 7/2021: Borderline 50% stenosis left proximal ICA1-49% LYLA stenosis

## 2024-09-10 NOTE — DISCUSSION/SUMMARY
[EKG obtained to assist in diagnosis and management of assessed problem(s)] : EKG obtained to assist in diagnosis and management of assessed problem(s) 71M with PMHx of MI s/p stents (1983), CAD s/p multiple balloon angioplasties up to a few years ago, DM II,  HLD, HTN, thyroid cancer s/p thyroidectomy, cholecystitis s/p sepsis requiring cholecystectomy,   PUD s/p gastric bypass, spinal stenosis, depression presented to the [place] ED 4/8/22 c/o of chest pain, nausea, and regurgitation. Pt underwent cardiac catheterization at [place] showing multivessel disease and was transferred to [facility] for CABG, s/p CABG 4/11/22.Endo consulted for DM and hypothyroidism.  T2DM x15 years, persisted despite bariatric surgery.  He reports good control with outpt A1c consistently around 7%.  He does not test his sugars.    He is currently taking Victoza 1.8 mg daily, MFN 1000 mg BID and Jardiance 10 mg daily at home.      T2DM complicated by CAD - A1c 7.5%  -Transitioned off insulin gtt, now on basal bolus   -To continue lantus 16 units daily and go up on admelog to 6 units tid with meals with ssi.  -check fingerstick ac tid hs  -diabetic diet.        Papillary thyroid cancer s/p Total thyroidectomy in 2018 with reported recurrence in upper chest area   which he thinks is growing. TSH <0.01 and is at goal given thyroid cancer with mets to chest  - cont LT4 137 mcg daily  - tried calling Dr Barber, to get more info on the chest recurrence, but office closed  - check thyroglobulin and Tg ab  - check free T4 levels     CAD (coronary artery disease) p/p Cath with multivessel disease and in-stent stenosis  -s/p  CABG today   - continue ASA, Lipitor, and Lopressor as BP permits   [FreeTextEntry1] : Patient is a 62yo F former smoker, family h/o CAD, obesity, asthma here for cardiac follow up   CAC: -CAC score 108 from 2022 and nonobstructive on CTA, negative nuclear stress in 2021 -Echo 5/2022 with preserved BiV function, no significant valve disease   CAROTID STENOSIS: -Stable carotid stenosis, improved on duplex 10/2023 and stable 9/2024. Med management  HTN: -On valsartan, increased and BP better controlled   HLD -On Rosuvastatin 40mg qhs and zetia 10mg daily, lipids significantly improved 8/2024   1. Continue medical management of carotid stenosis/CAC, on ASA / statin / zetia 2. Recommend aggressive diet and lifestyle modifications  3. Increase physical activity as tolerated  4. Ortho eval for back pain/sciatic 5. Ophtho follow up  6. Follow up 6 months, will consider further ischemic eval at that time given CAC/Carotid disease, no real change in symptoms now.  71M with PMHx of MI s/p stents (1983), CAD s/p multiple balloon angioplasties up to a few years ago, DM II,  HLD, HTN, thyroid cancer s/p thyroidectomy, cholecystitis s/p sepsis requiring cholecystectomy,   PUD s/p gastric bypass, spinal stenosis, depression presented to the [place] ED 4/8/22 c/o of chest pain, nausea, and regurgitation. Pt underwent cardiac catheterization at [place] showing multivessel disease and was transferred to [facility] for CABG, s/p CABG 4/11/22.Endo consulted for DM and hypothyroidism.  T2DM x15 years, persisted despite bariatric surgery.  He reports good control with outpt A1c consistently around 7%.  He does not test his sugars.    He is currently taking Victoza 1.8 mg daily, MFN 1000 mg BID and Jardiance 10 mg daily at home.      T2DM complicated by CAD - A1c 7.5%  -To continue lantus 16 units daily and go up on admelog to 6 units tid with meals with ssi.  -check fingerstick ac tid hs  -diabetic diet.        Papillary thyroid cancer s/p Total thyroidectomy in 2018 with reported recurrence in upper chest area   which he thinks is growing. TSH <0.01 and is at goal given thyroid cancer with mets to chest  - cont LT4 137 mcg daily  - tried calling Dr Barber, to get more info on the chest recurrence, but office closed  - check thyroglobulin and Tg ab  - check free T4 levels     CAD (coronary artery disease) p/p Cath with multivessel disease and in-stent stenosis  -s/p  CABG today   - continue ASA, Lipitor, and Lopressor as BP permits

## 2024-09-10 NOTE — HISTORY OF PRESENT ILLNESS
[FreeTextEntry1] : Patient is a 62yo F former smoker, strong family h/o CAD, obesity, carotid stenosis, asthma here for cardiac follow up. Had renal  mass removed and partial nephrectomy after last OV. States was cancerous but no need for chemo/radiation. Told excised and no spread.   Patient denies PND/orthopnea/edema/palpitations/syncope/claudication. Had eye surgery over the summer for cyst and tolerated well, struggling with some back pain. States radiates down her right leg. Pain moves from back and into groin then down her leg, fells tingling as well. Denies chest pain, chronic dyspnea which is essenitally unchanged. Some days better than others, worse in heat or cold.    Lives with  and teenage kids. 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

## 2024-09-25 ENCOUNTER — APPOINTMENT (OUTPATIENT)
Dept: ORTHOPEDIC SURGERY | Facility: CLINIC | Age: 64
End: 2024-09-25
Payer: COMMERCIAL

## 2024-09-25 VITALS — WEIGHT: 139 LBS | HEIGHT: 59 IN | BODY MASS INDEX: 28.02 KG/M2

## 2024-09-25 DIAGNOSIS — M50.90 CERVICAL DISC DISORDER, UNSPECIFIED, UNSPECIFIED CERVICAL REGION: ICD-10-CM

## 2024-09-25 DIAGNOSIS — M79.10 MYALGIA, UNSPECIFIED SITE: ICD-10-CM

## 2024-09-25 DIAGNOSIS — Z86.79 PERSONAL HISTORY OF OTHER DISEASES OF THE CIRCULATORY SYSTEM: ICD-10-CM

## 2024-09-25 DIAGNOSIS — M79.18 MYALGIA, OTHER SITE: ICD-10-CM

## 2024-09-25 DIAGNOSIS — M43.17 SPONDYLOLISTHESIS, LUMBOSACRAL REGION: ICD-10-CM

## 2024-09-25 DIAGNOSIS — M54.16 RADICULOPATHY, LUMBAR REGION: ICD-10-CM

## 2024-09-25 DIAGNOSIS — Z85.528 PERSONAL HISTORY OF OTHER MALIGNANT NEOPLASM OF KIDNEY: ICD-10-CM

## 2024-09-25 PROCEDURE — 72170 X-RAY EXAM OF PELVIS: CPT

## 2024-09-25 PROCEDURE — J3490M: CUSTOM

## 2024-09-25 PROCEDURE — 72110 X-RAY EXAM L-2 SPINE 4/>VWS: CPT

## 2024-09-25 PROCEDURE — 72040 X-RAY EXAM NECK SPINE 2-3 VW: CPT

## 2024-09-25 PROCEDURE — 99204 OFFICE O/P NEW MOD 45 MIN: CPT | Mod: 25

## 2024-09-25 PROCEDURE — 20552 NJX 1/MLT TRIGGER POINT 1/2: CPT

## 2024-09-25 RX ORDER — DIAZEPAM 2 MG/1
2 TABLET ORAL
Qty: 16 | Refills: 0 | Status: ACTIVE | COMMUNITY
Start: 2024-09-25 | End: 1900-01-01

## 2024-09-25 RX ORDER — CYCLOBENZAPRINE HYDROCHLORIDE 5 MG/1
5 TABLET, FILM COATED ORAL
Qty: 40 | Refills: 0 | Status: ACTIVE | COMMUNITY
Start: 2024-09-25 | End: 1900-01-01

## 2024-09-25 NOTE — DISCUSSION/SUMMARY
[Medication Risks Reviewed] : Medication risks reviewed [de-identified] : reviewed the case and the imaging with the patient  neck and lower back  lumbar radiculoapthy  cervical pain  discussion of the condition and treatment options cautions discussed questions answered discussion of natural history of the condition and what the next step would be MRI L spine  mri C spine  fu to review the MRis PT script

## 2024-09-25 NOTE — HISTORY OF PRESENT ILLNESS
[de-identified] : 9.25.24 ANGIE EDEN is a 63 year female who comes in today with neck and lower back pain; she states the pain started in 5/2024 without injury. She states the pain is to the right side of the back and radiates into the right groin and down the leg. She also has pain and numbness to the left side of the neck The patient states the pain got worse throughout the summer.  No radicular complaints into the arms. No loss of b/b control. No loss of fine motor.  She tried aleve, gabapentin, meloxicam and a heating pad without relief No PT/[facility]AL No chirocare No MRI No prior spine issues  Pmhx: Kidney cancer - did a robotic partial nephrectomy in 12/2023 - pain did start after removing drain from this surgery Asthma, Hyperlipidemia, thyroid issue carpal tunnel release bilaterally and right hand trigger finger surgery, eye surgeries to remove cysts  No hx diabetes  Occupation: Works in a [employer]   xrays today L-spine 4v -  L3-L4 slip  AP pelvis - no visible fractures  C-spine  C6-C7 DDD  [FreeTextEntry7] : right leg [FreeTextEntry9] : aleve

## 2024-10-04 ENCOUNTER — RESULT REVIEW (OUTPATIENT)
Age: 64
End: 2024-10-04

## 2024-11-06 ENCOUNTER — APPOINTMENT (OUTPATIENT)
Dept: ORTHOPEDIC SURGERY | Facility: CLINIC | Age: 64
End: 2024-11-06
Payer: COMMERCIAL

## 2024-11-06 DIAGNOSIS — M50.90 CERVICAL DISC DISORDER, UNSPECIFIED, UNSPECIFIED CERVICAL REGION: ICD-10-CM

## 2024-11-06 DIAGNOSIS — M79.18 MYALGIA, OTHER SITE: ICD-10-CM

## 2024-11-06 PROCEDURE — 99215 OFFICE O/P EST HI 40 MIN: CPT

## 2024-11-06 RX ORDER — GABAPENTIN 100 MG/1
100 CAPSULE ORAL
Qty: 60 | Refills: 1 | Status: ACTIVE | COMMUNITY
Start: 2024-11-06 | End: 1900-01-01

## 2024-11-25 ENCOUNTER — APPOINTMENT (OUTPATIENT)
Dept: PULMONOLOGY | Facility: CLINIC | Age: 64
End: 2024-11-25
Payer: COMMERCIAL

## 2024-11-25 VITALS
SYSTOLIC BLOOD PRESSURE: 140 MMHG | DIASTOLIC BLOOD PRESSURE: 76 MMHG | OXYGEN SATURATION: 96 % | HEIGHT: 58 IN | BODY MASS INDEX: 29.18 KG/M2 | WEIGHT: 139 LBS | HEART RATE: 84 BPM | RESPIRATION RATE: 16 BRPM

## 2024-11-25 DIAGNOSIS — R06.02 SHORTNESS OF BREATH: ICD-10-CM

## 2024-11-25 DIAGNOSIS — Z86.16 PERSONAL HISTORY OF COVID-19: ICD-10-CM

## 2024-11-25 DIAGNOSIS — G47.33 OBSTRUCTIVE SLEEP APNEA (ADULT) (PEDIATRIC): ICD-10-CM

## 2024-11-25 DIAGNOSIS — Z87.891 PERSONAL HISTORY OF NICOTINE DEPENDENCE: ICD-10-CM

## 2024-11-25 DIAGNOSIS — Z87.39 PERSONAL HISTORY OF OTHER DISEASES OF THE MUSCULOSKELETAL SYSTEM AND CONNECTIVE TISSUE: ICD-10-CM

## 2024-11-25 DIAGNOSIS — R09.82 POSTNASAL DRIP: ICD-10-CM

## 2024-11-25 DIAGNOSIS — Z85.528 PERSONAL HISTORY OF OTHER MALIGNANT NEOPLASM OF KIDNEY: ICD-10-CM

## 2024-11-25 DIAGNOSIS — E66.3 OVERWEIGHT: ICD-10-CM

## 2024-11-25 DIAGNOSIS — J45.909 UNSPECIFIED ASTHMA, UNCOMPLICATED: ICD-10-CM

## 2024-11-25 PROCEDURE — 99215 OFFICE O/P EST HI 40 MIN: CPT

## 2024-11-25 PROCEDURE — G2211 COMPLEX E/M VISIT ADD ON: CPT | Mod: NC

## 2024-11-25 RX ORDER — MOMETASONE 50 UG/1
50 SPRAY, METERED NASAL
Refills: 0 | Status: ACTIVE | COMMUNITY

## 2024-12-02 ENCOUNTER — APPOINTMENT (OUTPATIENT)
Dept: PAIN MANAGEMENT | Facility: CLINIC | Age: 64
End: 2024-12-02
Payer: COMMERCIAL

## 2024-12-02 VITALS — WEIGHT: 142 LBS | HEIGHT: 58 IN | BODY MASS INDEX: 29.81 KG/M2

## 2024-12-02 DIAGNOSIS — M47.816 SPONDYLOSIS W/OUT MYELOPATHY OR RADICULOPATHY, LUMBAR REGION: ICD-10-CM

## 2024-12-02 PROCEDURE — 96372 THER/PROPH/DIAG INJ SC/IM: CPT

## 2024-12-02 PROCEDURE — 99204 OFFICE O/P NEW MOD 45 MIN: CPT | Mod: 25

## 2024-12-02 RX ORDER — GABAPENTIN 300 MG/1
300 CAPSULE ORAL
Qty: 90 | Refills: 2 | Status: ACTIVE | COMMUNITY
Start: 2024-12-02 | End: 1900-01-01

## 2025-01-15 ENCOUNTER — APPOINTMENT (OUTPATIENT)
Dept: PAIN MANAGEMENT | Facility: CLINIC | Age: 65
End: 2025-01-15
Payer: COMMERCIAL

## 2025-01-15 DIAGNOSIS — M54.16 RADICULOPATHY, LUMBAR REGION: ICD-10-CM

## 2025-01-15 PROCEDURE — 64484 NJX AA&/STRD TFRM EPI L/S EA: CPT | Mod: 59,RT

## 2025-01-15 PROCEDURE — 64483 NJX AA&/STRD TFRM EPI L/S 1: CPT | Mod: RT

## 2025-01-24 ENCOUNTER — NON-APPOINTMENT (OUTPATIENT)
Age: 65
End: 2025-01-24

## 2025-01-24 DIAGNOSIS — I47.10 SUPRAVENTRICULAR TACHYCARDIA, UNSPECIFIED: ICD-10-CM

## 2025-01-27 ENCOUNTER — APPOINTMENT (OUTPATIENT)
Dept: PAIN MANAGEMENT | Facility: CLINIC | Age: 65
End: 2025-01-27
Payer: COMMERCIAL

## 2025-01-27 VITALS — BODY MASS INDEX: 30.02 KG/M2 | WEIGHT: 143 LBS | HEIGHT: 58 IN

## 2025-01-27 DIAGNOSIS — M54.16 RADICULOPATHY, LUMBAR REGION: ICD-10-CM

## 2025-01-27 PROCEDURE — 99214 OFFICE O/P EST MOD 30 MIN: CPT

## 2025-01-29 ENCOUNTER — APPOINTMENT (OUTPATIENT)
Dept: CARDIOLOGY | Facility: CLINIC | Age: 65
End: 2025-01-29
Payer: COMMERCIAL

## 2025-01-29 PROCEDURE — 93306 TTE W/DOPPLER COMPLETE: CPT

## 2025-01-29 PROCEDURE — ZZZZZ: CPT

## 2025-01-31 ENCOUNTER — NON-APPOINTMENT (OUTPATIENT)
Age: 65
End: 2025-01-31

## 2025-02-03 ENCOUNTER — NON-APPOINTMENT (OUTPATIENT)
Age: 65
End: 2025-02-03

## 2025-02-03 ENCOUNTER — APPOINTMENT (OUTPATIENT)
Dept: CARDIOLOGY | Facility: CLINIC | Age: 65
End: 2025-02-03
Payer: COMMERCIAL

## 2025-02-03 VITALS
SYSTOLIC BLOOD PRESSURE: 110 MMHG | WEIGHT: 137 LBS | HEIGHT: 58 IN | BODY MASS INDEX: 28.76 KG/M2 | DIASTOLIC BLOOD PRESSURE: 58 MMHG | HEART RATE: 72 BPM

## 2025-02-03 DIAGNOSIS — E78.5 HYPERLIPIDEMIA, UNSPECIFIED: ICD-10-CM

## 2025-02-03 DIAGNOSIS — I10 ESSENTIAL (PRIMARY) HYPERTENSION: ICD-10-CM

## 2025-02-03 DIAGNOSIS — R93.1 ABNORMAL FINDINGS ON DIAGNOSTIC IMAGING OF HEART AND CORONARY CIRCULATION: ICD-10-CM

## 2025-02-03 DIAGNOSIS — I47.10 SUPRAVENTRICULAR TACHYCARDIA, UNSPECIFIED: ICD-10-CM

## 2025-02-03 PROCEDURE — G2211 COMPLEX E/M VISIT ADD ON: CPT | Mod: NC

## 2025-02-03 PROCEDURE — 93000 ELECTROCARDIOGRAM COMPLETE: CPT

## 2025-02-03 PROCEDURE — 99214 OFFICE O/P EST MOD 30 MIN: CPT

## 2025-02-03 RX ORDER — DILTIAZEM HYDROCHLORIDE 120 MG/1
120 CAPSULE, EXTENDED RELEASE ORAL
Qty: 30 | Refills: 5 | Status: ACTIVE | COMMUNITY
Start: 2025-02-03 | End: 1900-01-01

## 2025-02-04 DIAGNOSIS — F41.9 ANXIETY DISORDER, UNSPECIFIED: ICD-10-CM

## 2025-03-05 ENCOUNTER — NON-APPOINTMENT (OUTPATIENT)
Age: 65
End: 2025-03-05

## 2025-03-07 ENCOUNTER — APPOINTMENT (OUTPATIENT)
Dept: CARDIOLOGY | Facility: CLINIC | Age: 65
End: 2025-03-07
Payer: COMMERCIAL

## 2025-03-07 PROCEDURE — A9500: CPT

## 2025-03-07 PROCEDURE — 78452 HT MUSCLE IMAGE SPECT MULT: CPT

## 2025-03-07 PROCEDURE — 93015 CV STRESS TEST SUPVJ I&R: CPT

## 2025-03-07 RX ORDER — REGADENOSON 0.08 MG/ML
0.4 INJECTION, SOLUTION INTRAVENOUS
Refills: 0 | Status: COMPLETED | OUTPATIENT
Start: 2025-03-07

## 2025-03-07 RX ADMIN — REGADENOSON 0.4 MG/5ML: 0.08 INJECTION, SOLUTION INTRAVENOUS at 00:00

## 2025-03-24 ENCOUNTER — APPOINTMENT (OUTPATIENT)
Dept: PAIN MANAGEMENT | Facility: CLINIC | Age: 65
End: 2025-03-24
Payer: COMMERCIAL

## 2025-03-24 VITALS — HEIGHT: 58 IN | WEIGHT: 142 LBS | BODY MASS INDEX: 29.81 KG/M2

## 2025-03-24 DIAGNOSIS — M54.16 RADICULOPATHY, LUMBAR REGION: ICD-10-CM

## 2025-03-24 DIAGNOSIS — M25.562 PAIN IN LEFT KNEE: ICD-10-CM

## 2025-03-24 PROCEDURE — 20610 DRAIN/INJ JOINT/BURSA W/O US: CPT | Mod: LT

## 2025-03-24 PROCEDURE — J3490M: CUSTOM

## 2025-03-24 PROCEDURE — 73560 X-RAY EXAM OF KNEE 1 OR 2: CPT | Mod: LT

## 2025-03-24 PROCEDURE — 99214 OFFICE O/P EST MOD 30 MIN: CPT | Mod: 25

## 2025-03-25 ENCOUNTER — APPOINTMENT (OUTPATIENT)
Dept: PULMONOLOGY | Facility: CLINIC | Age: 65
End: 2025-03-25

## 2025-04-17 ENCOUNTER — APPOINTMENT (OUTPATIENT)
Dept: CARDIOLOGY | Facility: CLINIC | Age: 65
End: 2025-04-17
Payer: COMMERCIAL

## 2025-04-17 ENCOUNTER — NON-APPOINTMENT (OUTPATIENT)
Age: 65
End: 2025-04-17

## 2025-04-17 VITALS
HEART RATE: 68 BPM | BODY MASS INDEX: 29.18 KG/M2 | HEIGHT: 58 IN | WEIGHT: 139 LBS | SYSTOLIC BLOOD PRESSURE: 110 MMHG | DIASTOLIC BLOOD PRESSURE: 68 MMHG

## 2025-04-17 DIAGNOSIS — Z82.49 FAMILY HISTORY OF ISCHEMIC HEART DISEASE AND OTHER DISEASES OF THE CIRCULATORY SYSTEM: ICD-10-CM

## 2025-04-17 DIAGNOSIS — E78.5 HYPERLIPIDEMIA, UNSPECIFIED: ICD-10-CM

## 2025-04-17 DIAGNOSIS — R93.1 ABNORMAL FINDINGS ON DIAGNOSTIC IMAGING OF HEART AND CORONARY CIRCULATION: ICD-10-CM

## 2025-04-17 DIAGNOSIS — Z87.891 PERSONAL HISTORY OF NICOTINE DEPENDENCE: ICD-10-CM

## 2025-04-17 DIAGNOSIS — I34.0 NONRHEUMATIC MITRAL (VALVE) INSUFFICIENCY: ICD-10-CM

## 2025-04-17 DIAGNOSIS — I65.29 OCCLUSION AND STENOSIS OF UNSPECIFIED CAROTID ARTERY: ICD-10-CM

## 2025-04-17 DIAGNOSIS — I47.29 OTHER VENTRICULAR TACHYCARDIA: ICD-10-CM

## 2025-04-17 DIAGNOSIS — I47.10 SUPRAVENTRICULAR TACHYCARDIA, UNSPECIFIED: ICD-10-CM

## 2025-04-17 PROCEDURE — 93000 ELECTROCARDIOGRAM COMPLETE: CPT

## 2025-04-17 PROCEDURE — G2211 COMPLEX E/M VISIT ADD ON: CPT | Mod: NC

## 2025-04-17 PROCEDURE — 99214 OFFICE O/P EST MOD 30 MIN: CPT

## 2025-04-30 ENCOUNTER — APPOINTMENT (OUTPATIENT)
Dept: PAIN MANAGEMENT | Facility: CLINIC | Age: 65
End: 2025-04-30
Payer: COMMERCIAL

## 2025-04-30 DIAGNOSIS — M54.16 RADICULOPATHY, LUMBAR REGION: ICD-10-CM

## 2025-04-30 PROCEDURE — 64484 NJX AA&/STRD TFRM EPI L/S EA: CPT | Mod: RT,59

## 2025-04-30 PROCEDURE — 64483 NJX AA&/STRD TFRM EPI L/S 1: CPT | Mod: RT

## 2025-05-12 ENCOUNTER — APPOINTMENT (OUTPATIENT)
Dept: PAIN MANAGEMENT | Facility: CLINIC | Age: 65
End: 2025-05-12
Payer: COMMERCIAL

## 2025-05-12 VITALS — WEIGHT: 139 LBS | BODY MASS INDEX: 29.18 KG/M2 | HEIGHT: 58 IN

## 2025-05-12 DIAGNOSIS — M54.16 RADICULOPATHY, LUMBAR REGION: ICD-10-CM

## 2025-05-12 PROCEDURE — 99213 OFFICE O/P EST LOW 20 MIN: CPT

## 2025-05-29 ENCOUNTER — APPOINTMENT (OUTPATIENT)
Dept: PAIN MANAGEMENT | Facility: CLINIC | Age: 65
End: 2025-05-29
Payer: COMMERCIAL

## 2025-05-29 VITALS — HEIGHT: 58 IN | BODY MASS INDEX: 29.18 KG/M2 | WEIGHT: 139 LBS

## 2025-05-29 DIAGNOSIS — M54.16 RADICULOPATHY, LUMBAR REGION: ICD-10-CM

## 2025-05-29 PROCEDURE — 99214 OFFICE O/P EST MOD 30 MIN: CPT

## 2025-05-29 RX ORDER — METHYLPREDNISOLONE 4 MG/1
4 TABLET ORAL
Qty: 1 | Refills: 0 | Status: ACTIVE | COMMUNITY
Start: 2025-05-29 | End: 1900-01-01

## 2025-06-04 ENCOUNTER — APPOINTMENT (OUTPATIENT)
Dept: PAIN MANAGEMENT | Facility: CLINIC | Age: 65
End: 2025-06-04

## 2025-06-09 ENCOUNTER — APPOINTMENT (OUTPATIENT)
Dept: PAIN MANAGEMENT | Facility: CLINIC | Age: 65
End: 2025-06-09
Payer: COMMERCIAL

## 2025-06-09 VITALS — WEIGHT: 138 LBS | HEIGHT: 58 IN | BODY MASS INDEX: 28.97 KG/M2

## 2025-06-09 PROCEDURE — 99214 OFFICE O/P EST MOD 30 MIN: CPT

## 2025-06-11 ENCOUNTER — APPOINTMENT (OUTPATIENT)
Dept: PULMONOLOGY | Facility: CLINIC | Age: 65
End: 2025-06-11
Payer: COMMERCIAL

## 2025-06-11 VITALS
SYSTOLIC BLOOD PRESSURE: 118 MMHG | RESPIRATION RATE: 16 BRPM | HEART RATE: 80 BPM | DIASTOLIC BLOOD PRESSURE: 64 MMHG | OXYGEN SATURATION: 97 %

## 2025-06-11 VITALS — WEIGHT: 136 LBS | BODY MASS INDEX: 28.94 KG/M2 | HEIGHT: 57.5 IN

## 2025-06-11 PROBLEM — Z86.79 HISTORY OF ATRIAL FIBRILLATION: Status: RESOLVED | Noted: 2025-06-11 | Resolved: 2025-06-11

## 2025-06-11 PROBLEM — Z87.39 HISTORY OF CHRONIC BACK PAIN: Status: RESOLVED | Noted: 2025-06-11 | Resolved: 2025-06-11

## 2025-06-11 PROBLEM — E66.811 OBESITY (BMI 30.0-34.9): Status: RESOLVED | Noted: 2017-04-11 | Resolved: 2025-06-11

## 2025-06-11 PROCEDURE — 94010 BREATHING CAPACITY TEST: CPT

## 2025-06-11 PROCEDURE — 94729 DIFFUSING CAPACITY: CPT

## 2025-06-11 PROCEDURE — 94727 GAS DIL/WSHOT DETER LNG VOL: CPT

## 2025-06-11 PROCEDURE — 99214 OFFICE O/P EST MOD 30 MIN: CPT | Mod: 25

## 2025-06-11 PROCEDURE — 85018 HEMOGLOBIN: CPT | Mod: QW

## 2025-06-25 ENCOUNTER — APPOINTMENT (OUTPATIENT)
Dept: PAIN MANAGEMENT | Facility: CLINIC | Age: 65
End: 2025-06-25
Payer: COMMERCIAL

## 2025-06-25 PROCEDURE — 64483 NJX AA&/STRD TFRM EPI L/S 1: CPT | Mod: 50

## 2025-07-16 ENCOUNTER — APPOINTMENT (OUTPATIENT)
Dept: PAIN MANAGEMENT | Facility: CLINIC | Age: 65
End: 2025-07-16

## 2025-07-17 ENCOUNTER — NON-APPOINTMENT (OUTPATIENT)
Age: 65
End: 2025-07-17

## 2025-07-28 ENCOUNTER — APPOINTMENT (OUTPATIENT)
Dept: PAIN MANAGEMENT | Facility: CLINIC | Age: 65
End: 2025-07-28
Payer: COMMERCIAL

## 2025-07-28 VITALS — BODY MASS INDEX: 29.58 KG/M2 | WEIGHT: 139 LBS | HEIGHT: 57.5 IN

## 2025-07-28 DIAGNOSIS — M54.16 RADICULOPATHY, LUMBAR REGION: ICD-10-CM

## 2025-07-28 PROCEDURE — 20552 NJX 1/MLT TRIGGER POINT 1/2: CPT

## 2025-07-28 PROCEDURE — 99214 OFFICE O/P EST MOD 30 MIN: CPT | Mod: 25

## 2025-07-28 PROCEDURE — J3490M: CUSTOM

## 2025-08-27 ENCOUNTER — APPOINTMENT (OUTPATIENT)
Dept: PAIN MANAGEMENT | Facility: CLINIC | Age: 65
End: 2025-08-27
Payer: COMMERCIAL

## 2025-08-27 VITALS — WEIGHT: 139 LBS | HEIGHT: 57 IN | BODY MASS INDEX: 29.99 KG/M2

## 2025-08-27 DIAGNOSIS — M47.816 SPONDYLOSIS W/OUT MYELOPATHY OR RADICULOPATHY, LUMBAR REGION: ICD-10-CM

## 2025-08-27 DIAGNOSIS — M79.10 MYALGIA, UNSPECIFIED SITE: ICD-10-CM

## 2025-08-27 DIAGNOSIS — M25.562 PAIN IN LEFT KNEE: ICD-10-CM

## 2025-08-27 PROCEDURE — 20610 DRAIN/INJ JOINT/BURSA W/O US: CPT | Mod: LT

## 2025-08-27 PROCEDURE — 99214 OFFICE O/P EST MOD 30 MIN: CPT | Mod: 25

## 2025-08-27 PROCEDURE — J3490M: CUSTOM

## 2025-09-17 ENCOUNTER — APPOINTMENT (OUTPATIENT)
Dept: PAIN MANAGEMENT | Facility: CLINIC | Age: 65
End: 2025-09-17
Payer: COMMERCIAL

## 2025-09-17 VITALS — BODY MASS INDEX: 30.42 KG/M2 | HEIGHT: 57 IN | WEIGHT: 141 LBS

## 2025-09-17 DIAGNOSIS — M79.10 MYALGIA, UNSPECIFIED SITE: ICD-10-CM

## 2025-09-17 PROCEDURE — 99214 OFFICE O/P EST MOD 30 MIN: CPT | Mod: 25

## 2025-09-17 PROCEDURE — J3490M: CUSTOM

## 2025-09-17 PROCEDURE — 20552 NJX 1/MLT TRIGGER POINT 1/2: CPT
